# Patient Record
Sex: MALE | Race: WHITE | NOT HISPANIC OR LATINO | Employment: OTHER | URBAN - METROPOLITAN AREA
[De-identification: names, ages, dates, MRNs, and addresses within clinical notes are randomized per-mention and may not be internally consistent; named-entity substitution may affect disease eponyms.]

---

## 2017-07-25 RX ORDER — OMEPRAZOLE 20 MG/1
20 CAPSULE, DELAYED RELEASE ORAL DAILY
Qty: 90 CAPSULE | Refills: 0 | Status: SHIPPED | OUTPATIENT
Start: 2017-07-25 | End: 2022-04-07

## 2018-03-23 ENCOUNTER — TELEPHONE (OUTPATIENT)
Dept: BARIATRICS/WEIGHT MGMT | Facility: CLINIC | Age: 63
End: 2018-03-23

## 2018-03-23 NOTE — TELEPHONE ENCOUNTER
Pt called in wanting a rx for zofran.  Pt stated he is having some nausea when he eats. Please advise, thank you.

## 2018-03-23 NOTE — TELEPHONE ENCOUNTER
Notified pt that he will need a f/up to be seen for nausea.  Pt verbalized understanding and stated that he will see if his PCP will give him zofran.  Pt stated he will call back and schedule a appointment.

## 2018-05-30 ENCOUNTER — TELEPHONE (OUTPATIENT)
Dept: BARIATRICS/WEIGHT MGMT | Facility: CLINIC | Age: 63
End: 2018-05-30

## 2018-05-30 NOTE — TELEPHONE ENCOUNTER
Notified pt that we will refill 1 time and he will have to make an appointment to have further refills. Pt verbalized understanding.

## 2022-01-11 ENCOUNTER — OFFICE VISIT (OUTPATIENT)
Dept: BARIATRICS/WEIGHT MGMT | Facility: CLINIC | Age: 67
End: 2022-01-11

## 2022-01-11 VITALS
WEIGHT: 306.5 LBS | BODY MASS INDEX: 43.88 KG/M2 | HEART RATE: 76 BPM | SYSTOLIC BLOOD PRESSURE: 142 MMHG | OXYGEN SATURATION: 98 % | HEIGHT: 70 IN | DIASTOLIC BLOOD PRESSURE: 88 MMHG

## 2022-01-11 DIAGNOSIS — E66.01 OBESITY, CLASS III, BMI 40-49.9 (MORBID OBESITY): ICD-10-CM

## 2022-01-11 DIAGNOSIS — E55.9 HYPOVITAMINOSIS D: ICD-10-CM

## 2022-01-11 DIAGNOSIS — Z90.3 POSTGASTRECTOMY MALABSORPTION: ICD-10-CM

## 2022-01-11 DIAGNOSIS — Z13.21 MALNUTRITION SCREEN: ICD-10-CM

## 2022-01-11 DIAGNOSIS — R53.83 FATIGUE, UNSPECIFIED TYPE: Primary | ICD-10-CM

## 2022-01-11 DIAGNOSIS — K91.2 POSTGASTRECTOMY MALABSORPTION: ICD-10-CM

## 2022-01-11 DIAGNOSIS — Z13.0 SCREENING, IRON DEFICIENCY ANEMIA: ICD-10-CM

## 2022-01-11 PROCEDURE — 99204 OFFICE O/P NEW MOD 45 MIN: CPT | Performed by: SURGERY

## 2022-01-11 NOTE — PROGRESS NOTES
[facility]  [address]  [address]  [facility] [id number] [phone]        Patient Name:  Marco A Lea Jr..  :  1955      Date of Visit: 2022      Reason for Visit:   6.5 years postop      HPI: Marco A Lea Jr. is a 66 y.o. male s/p  LSG by CHRISTIAN on 8/6/15, s/p uneventful lap brandy. by Dr. Garza  on 16 for postprandial nausea.  LOV 2016.    Presents after prolonged absence.  Lowest weight after sleeve was 265.      Doing well.  No issues/concerns. Denies dysphagia, reflux, nausea, vomiting and abdominal pain.  Getting ??g prot/day.  Taking mag, Zinc, vit A, vit D, vit E, vit C, Vit B12..  Exercise: nothing.     Presurgery weight: 352 pounds.  Today's weight is (!) 139 kg (306 lb 8 oz) pounds, today's  Body mass index is 44.61 kg/m²., and@ weight loss since surgery is 46 pounds.  Gained about 50 pounds during COVID.      Interested in SIPS.  His wife's cousin just had a SIPS here 2-3 weeks ago.    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  Draws disability also.    Denies GERD. Prilosec PRN but hasn't taken in years.    Had panniculectomy at  3 years ago.      Past Medical History:   Diagnosis Date   • Chronic back pain     hurt in mining accident, disabled for back.     • Chronic narcotic use     per PCP   • Depression    • Dyspepsia    • Fatigue    • HTN (hypertension)    • OA (ocular albinism) (HCC)      Past Surgical History:   Procedure Laterality Date   • ANTERIOR CERVICAL FUSION     • COLON RESECTION  2014    reports laparoscopic bowel surgery for diverticulitis (not perforated, no colostomy, no laparotomy scars)   • GASTRIC SLEEVE LAPAROSCOPIC      Dr. Garza   • LAPAROSCOPIC CHOLECYSTECTOMY      Dr. Garza   • PANNICULECTOMY  2018    .  Complicated by infection   • SHOULDER SURGERY     • TONSILLECTOMY       Outpatient Medications Marked as Taking for the 22 encounter (Office Visit) with Kevin  "Farida Pleitez MD   Medication Sig Dispense Refill   • aspirin 325 MG EC tablet      • CloNIDine (CATAPRES) 0.1 MG tablet      • cyclobenzaprine (FLEXERIL) 10 MG tablet      • DULoxetine (CYMBALTA) 60 MG capsule      • HYDROcodone-acetaminophen (NORCO) 7.5-325 MG per tablet      • metoprolol succinate XL (TOPROL XL) 100 MG 24 hr tablet      • naproxen (EC NAPROSYN) 500 MG EC tablet      • omeprazole (PRILOSEC) 20 MG capsule Take 1 capsule by mouth Daily. 90 capsule 0   • ondansetron (ZOFRAN) 4 MG tablet Take 1 tablet by mouth Every 8 (Eight) Hours As Needed for nausea or vomiting. 20 tablet 0   • valsartan-hydrochlorothiazide (DIOVAN HCT) 160-12.5 MG per tablet          No Known Allergies    Social History     Socioeconomic History   • Marital status:    Tobacco Use   • Smoking status: Never Smoker   • Smokeless tobacco: Never Used   Substance and Sexual Activity   • Alcohol use: Never   • Drug use: Never   • Sexual activity: Defer       /88   Pulse 76   Ht 176.5 cm (69.5\")   Wt (!) 139 kg (306 lb 8 oz)   SpO2 98%   BMI 44.61 kg/m²     Physical Exam  Constitutional:       General: He is not in acute distress.     Appearance: He is well-developed. He is not diaphoretic.   HENT:      Head: Normocephalic and atraumatic.      Mouth/Throat:      Pharynx: No oropharyngeal exudate.   Eyes:      Conjunctiva/sclera: Conjunctivae normal.      Pupils: Pupils are equal, round, and reactive to light.   Pulmonary:      Effort: Pulmonary effort is normal. No respiratory distress.   Abdominal:      General: There is no distension.      Palpations: Abdomen is soft.      Comments: Hip to hip panniculectomy scar, very low umbilicus.  Prominent diastasis, lap scars   Skin:     General: Skin is warm and dry.      Coloration: Skin is not pale.   Neurological:      Mental Status: He is alert and oriented to person, place, and time.      Cranial Nerves: No cranial nerve deficit.   Psychiatric:         Behavior: Behavior " normal.         Thought Content: Thought content normal.           Assessment:  6.5 years s/p  LSG by GDW on 8/6/15    ICD-10-CM ICD-9-CM   1. Fatigue, unspecified type  R53.83 780.79   2. Hypovitaminosis D  E55.9 268.9   3. Malnutrition screen  Z13.21 V77.2   4. Screening, iron deficiency anemia  Z13.0 V78.0   5. Postgastrectomy malabsorption  K91.2 579.3    Z90.3    6. Obesity, Class III, BMI 40-49.9 (morbid obesity) ([facility])  E66.01 278.01         Plan:  Doing well. Continue w/ good food choices and healthy habits.  Continue protein >70g/day.  Continue routine exercise.  Routine bariatric labs ordered.  Continue vitamins w/ adjustments pending lab results.  Call w/ problems/concerns.     Refer to [facility] re: weight regain.  Fill out revision packet.    The patient was instructed to follow up in 1 year, sooner if needed.    note: approx 15 of the 25 minute visit was spent counseling on nutrition and necessary dietary/lifestyle modifications face to face.    Farida Brown MD

## 2022-02-23 ENCOUNTER — OFFICE VISIT (OUTPATIENT)
Dept: BARIATRICS/WEIGHT MGMT | Facility: CLINIC | Age: 67
End: 2022-02-23

## 2022-02-23 VITALS
BODY MASS INDEX: 44.38 KG/M2 | OXYGEN SATURATION: 99 % | HEIGHT: 70 IN | RESPIRATION RATE: 18 BRPM | TEMPERATURE: 97.9 F | WEIGHT: 310 LBS | SYSTOLIC BLOOD PRESSURE: 126 MMHG | DIASTOLIC BLOOD PRESSURE: 78 MMHG | HEART RATE: 84 BPM

## 2022-02-23 DIAGNOSIS — E66.01 OBESITY, CLASS III, BMI 40-49.9 (MORBID OBESITY): Primary | ICD-10-CM

## 2022-02-23 PROCEDURE — 99213 OFFICE O/P EST LOW 20 MIN: CPT | Performed by: PHYSICIAN ASSISTANT

## 2022-02-23 RX ORDER — KETOROLAC TROMETHAMINE 10 MG/1
10 TABLET, FILM COATED ORAL EVERY 6 HOURS PRN
COMMUNITY
Start: 2021-12-09

## 2022-02-23 RX ORDER — LIDOCAINE 50 MG/G
1 PATCH TOPICAL EVERY 24 HOURS
COMMUNITY
Start: 2022-02-04

## 2022-02-23 RX ORDER — OXYCODONE AND ACETAMINOPHEN 10; 325 MG/1; MG/1
1 TABLET ORAL EVERY 6 HOURS PRN
COMMUNITY

## 2022-02-23 RX ORDER — MECLIZINE HYDROCHLORIDE 25 MG/1
25 TABLET ORAL 3 TIMES DAILY PRN
COMMUNITY

## 2022-04-07 ENCOUNTER — OFFICE VISIT (OUTPATIENT)
Dept: BARIATRICS/WEIGHT MGMT | Facility: CLINIC | Age: 67
End: 2022-04-07

## 2022-04-07 VITALS
DIASTOLIC BLOOD PRESSURE: 62 MMHG | OXYGEN SATURATION: 98 % | HEIGHT: 70 IN | BODY MASS INDEX: 44.67 KG/M2 | TEMPERATURE: 97.6 F | SYSTOLIC BLOOD PRESSURE: 122 MMHG | WEIGHT: 312 LBS | HEART RATE: 74 BPM | RESPIRATION RATE: 18 BRPM

## 2022-04-07 DIAGNOSIS — E55.9 HYPOVITAMINOSIS D: ICD-10-CM

## 2022-04-07 DIAGNOSIS — I10 HYPERTENSION, UNSPECIFIED TYPE: ICD-10-CM

## 2022-04-07 DIAGNOSIS — F32.9 REACTIVE DEPRESSION: ICD-10-CM

## 2022-04-07 DIAGNOSIS — E78.5 HYPERLIPIDEMIA, UNSPECIFIED HYPERLIPIDEMIA TYPE: ICD-10-CM

## 2022-04-07 DIAGNOSIS — E66.01 OBESITY, CLASS III, BMI 40-49.9 (MORBID OBESITY): Primary | ICD-10-CM

## 2022-04-07 DIAGNOSIS — R53.83 FATIGUE, UNSPECIFIED TYPE: ICD-10-CM

## 2022-04-07 DIAGNOSIS — E74.89 OTHER SPECIFIED DISORDERS OF CARBOHYDRATE METABOLISM: ICD-10-CM

## 2022-04-07 DIAGNOSIS — R74.8 ELEVATED LIVER ENZYMES: ICD-10-CM

## 2022-04-07 DIAGNOSIS — M19.90 OSTEOARTHRITIS, UNSPECIFIED OSTEOARTHRITIS TYPE, UNSPECIFIED SITE: ICD-10-CM

## 2022-04-07 DIAGNOSIS — Z51.81 ENCOUNTER FOR THERAPEUTIC DRUG LEVEL MONITORING: ICD-10-CM

## 2022-04-07 PROBLEM — I45.10 RBBB: Status: ACTIVE | Noted: 2017-09-21

## 2022-04-07 PROCEDURE — 99215 OFFICE O/P EST HI 40 MIN: CPT | Performed by: NURSE PRACTITIONER

## 2022-04-07 PROCEDURE — G2212 PROLONG OUTPT/OFFICE VIS: HCPCS | Performed by: NURSE PRACTITIONER

## 2022-04-07 PROCEDURE — 96372 THER/PROPH/DIAG INJ SC/IM: CPT | Performed by: NURSE PRACTITIONER

## 2022-04-07 RX ORDER — CYANOCOBALAMIN 1000 UG/ML
1000 INJECTION, SOLUTION INTRAMUSCULAR; SUBCUTANEOUS ONCE
Status: COMPLETED | OUTPATIENT
Start: 2022-04-07 | End: 2022-04-07

## 2022-04-07 RX ORDER — ERGOCALCIFEROL 1.25 MG/1
50000 CAPSULE ORAL WEEKLY
Qty: 4 CAPSULE | Refills: 2 | Status: SHIPPED | OUTPATIENT
Start: 2022-04-07 | End: 2022-07-14

## 2022-04-07 RX ORDER — PROPRANOLOL HYDROCHLORIDE 40 MG/1
40 TABLET ORAL DAILY
COMMUNITY
Start: 2022-03-02

## 2022-04-07 RX ADMIN — CYANOCOBALAMIN 1000 MCG: 1000 INJECTION, SOLUTION INTRAMUSCULAR; SUBCUTANEOUS at 16:50

## 2022-04-07 NOTE — ASSESSMENT & PLAN NOTE
Patient's (Body mass index is 45.41 kg/m².) indicates that they are morbidly obese (BMI > 40 or > 35 with obesity - related health condition) with health conditions that include hypertension, dyslipidemias, GERD, osteoarthritis and depression . Weight is worsening. BMI is is above average; BMI management plan is completed. We discussed low calorie, low carb based diet program, portion control, increasing exercise, management of depression/anxiety/stress to control compensatory eating and food journal.  Topics of discussion included obesity as a disease, nutritional education on food groups, exercise, and medications. Patient was instructed in adequate protein, controlled carb and controlled fat intake.   Patient received instructions on using the medicines as a tool in controlling their weight with nutritional and behavioral changes. Risks and benefits were discussed. I believe the potential benefits of medication helping to decrease weight outweighs the risks. Patient is to try nutritonal/behavioral changes only first.   Patient received our clinic education booklet.   Our patient consent form was reviewed including potential risks of weight loss. We also reviewed our confidentiality and HIPPA statements. Patients current FITT score was reviewed along with current capability for exercise tolerance and a patient will work towards a FITT score of:     Frequency   Intensity Time Strength Training   []   0 None  []   0 None  []   0 None  []   0 None    []   1 (1-2x/week) []   1 (light) []   1 (<10 min) []   1 (1x/week)   [x]   2 (3-5x/week) [x]   2 (moderate) []   2 (10-20 min) [x]   2 (2x/week)   []   3 (daily)   []   3 (moderately hard)  []   4 (very hard) []   3 (20-30 min)  [x]   4 (>30 min) []   3 (3-4x/week)       Patient's past medical history was reviewed in detail and barriers to weight loss were identified and discussed. Past efforts at weight reduction on their own as well as under physician supervision were  documented and discussed.  I advised patient to continue routine care with their Primary Care Provider.     Nutritional recommendations and goals were reviewed including Calories:8962-1794 daily adjusted for exercise calories burnt, Protein: 100-150 g daily, Net carbs (total carb - fiber) of 50-75g per day.  Start to keep a food journal and bring into next visit in 2 weeks for review. Practice the behavioral modification technique of mindful eating. Take one MVI daily and 2000mg fish oil daily. Take other medications and supplements as directed.  Complete labwork (external order given).  Decrease cymbalta by 1/2 until next follow up.   Try b-12 injection today for energy, start vitamin D high dose.   Consider: tenuate (liked in the past), metformin to counterbalance beta blocker and cymbalta, topiramate because of MBS status.   Increase physical activity as tolerated with back pain.

## 2022-04-07 NOTE — PROGRESS NOTES
"  [facility]  [address]  [place] [address]   Office Note      Date: 2022  Patient Name: Marco A Lae Jr.  MRN: [id number]  : 1955  Subjective  Subjective     Chief Complaint  Obesity Management follow-up          Marco A Lea Jr. presents to [facility] for obesity management and weight regain following MBS. s/p LSG 8/6/15 [facility]. Presurgery weight: 352 lbs.  Lowest weight: 265 lbs. Met with Monserrat Carrion PA-C to discuss SIPS revision option and has decided instead to pursue medical weight loss. Still has +satiety, has to use zofran for nausea if he eats too much. Gained 50+ lb during covid. Energy level is low, can't find the \"umph\" to restart weight loss. Took tenuate years ago, loved it, interested in retrying. 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 Was a weight  prior to his work-related back injury. The following seem to sabotage weight loss efforts:Lack of time for planning & self, comfort/stress eating, enjoyment of food, mindless eating (snacking while working or watching TV) and boredom eating  Highest lifetime weight: 375 pounds. Today's weight is312.0 (!) 142 kg (312 lb) pounds.   Weight 5 years ago: 260  The patient is not currently exercising, has never counted calories.     Pertinent medical history  Hypertension: on clonidine,valsartan-hctz. Had exercise stress test in 2019 due to family history. Normal per patient. Per patient hypertension is perventive.   Tremors: started propranolol about 6 months.   Hyperlipidemia: took medication but hated how he felt.  Elevated liver enzymes: patient unaware of any diagnosis.   GERD: stable on omeprazole.   OA: chronic narcotic use, cymbalta  Depression: started cymbalta after his son passed.   - snoring, was evaluated for HUYEN in the past but was negative.   Extremely dry skin    Review of Systems " "  Constitutional: Positive for fatigue (weight gain).        Positive for weight gain   HENT: Negative for trouble swallowing.         Negative for throat swelling   Respiratory: Negative for shortness of breath and wheezing.         Negative for snoring   Cardiovascular: Negative for chest pain, palpitations and leg swelling.   Gastrointestinal: Negative for abdominal pain, constipation, diarrhea, GERD and indigestion.   Endocrine: Negative for cold intolerance, heat intolerance, polydipsia, polyphagia and polyuria.        Negative for loss of hair  Negative for hirsutism     Genitourinary:        Denies menstrual irregularities   Musculoskeletal: Positive for back pain (excessive limitations and chronic pain). Negative for arthralgias.        Denies exercise limitations  Denies chronic pain   Skin: Positive for dry skin.        Negative for acne   Neurological: Negative for headache and memory problem.        Negative for paresthesias   Psychiatric/Behavioral: Positive for depressed mood. Negative for self-injury, sleep disturbance and suicidal ideas. The patient is not nervous/anxious.      PHQ-9 Total Score:   13      Objective   Body mass index is 45.41 kg/m².  Body composition analysis completed and showed:   %body fat: 64.4    Measurements (in inches)  Neck: 18\"  Chest: 54.2\"  Waist: 56\"  Hips: 57\"  Thighs: 47,5\"    Vital Signs:   /62 (BP Location: Right arm, Patient Position: Sitting, Cuff Size: Adult)   Pulse 74   Temp 97.6 °F (36.4 °C) (Temporal)   Resp 18   Ht 176.5 cm (69.5\")   Wt (!) 142 kg (312 lb)   SpO2 98%   BMI 45.41 kg/m²     Physical Exam   General appears stated age and normal appearance   HEENT PERRLA, EOM intact, conjunctivae normal and without thyromegaly or thyroid nodules   Chest/lungs Normal rate, Regular rhythm, Breathing is unlabored and Clear to auscultation bilaterally   Abdomen deferred   Extremities pitting edema 2+ BLE   Neuro Good historian and No focal deficit   Skin " Dry skin and Warm, dry, intact   Psych normal behavior, normal thought content and normal concentration     Result Review :   The following data was reviewed by: WINIFRED Lechuga on 04/07/2022:  CMP- ^ALT/AST, Vit D 30                  Assessment / Plan       Diagnoses and all orders for this visit:    1. Obesity, Class III, BMI 40-49.9 (morbid obesity) ([facility]) (Primary)  Assessment & Plan:  Patient's (Body mass index is 45.41 kg/m².) indicates that they are morbidly obese (BMI > 40 or > 35 with obesity - related health condition) with health conditions that include hypertension, dyslipidemias, GERD, osteoarthritis and depression . Weight is worsening. BMI is is above average; BMI management plan is completed. We discussed low calorie, low carb based diet program, portion control, increasing exercise, management of depression/anxiety/stress to control compensatory eating and food journal.  Topics of discussion included obesity as a disease, nutritional education on food groups, exercise, and medications. Patient was instructed in adequate protein, controlled carb and controlled fat intake.   Patient received instructions on using the medicines as a tool in controlling their weight with nutritional and behavioral changes. Risks and benefits were discussed. I believe the potential benefits of medication helping to decrease weight outweighs the risks. Patient is to try nutritonal/behavioral changes only first.   Patient received our clinic education booklet.   Our patient consent form was reviewed including potential risks of weight loss. We also reviewed our confidentiality and HIPPA statements. Patients current FITT score was reviewed along with current capability for exercise tolerance and a patient will work towards a FITT score of:     Frequency   Intensity Time Strength Training   []   0 None  []   0 None  []   0 None  []   0 None    []   1 (1-2x/week) []   1 (light) []   1 (<10 min) []   1 (1x/week)   [x]   2  (3-5x/week) [x]   2 (moderate) []   2 (10-20 min) [x]   2 (2x/week)   []   3 (daily)   []   3 (moderately hard)  []   4 (very hard) []   3 (20-30 min)  [x]   4 (>30 min) []   3 (3-4x/week)       Patient's past medical history was reviewed in detail and barriers to weight loss were identified and discussed. Past efforts at weight reduction on their own as well as under physician supervision were documented and discussed.  I advised patient to continue routine care with their Primary Care Provider.     Nutritional recommendations and goals were reviewed including Calories:8858-4059 daily adjusted for exercise calories burnt, Protein: 100-150 g daily, Net carbs (total carb - fiber) of 50-75g per day.  Start to keep a food journal and bring into next visit in 2 weeks for review. Practice the behavioral modification technique of mindful eating. Take one MVI daily and 2000mg fish oil daily. Take other medications and supplements as directed.  Complete labwork (external order given).  Decrease cymbalta by 1/2 until next follow up.   Try b-12 injection today for energy, start vitamin D high dose.   Consider: tenuate (liked in the past), metformin to counterbalance beta blocker and cymbalta, topiramate because of MBS status.   Increase physical activity as tolerated with back pain.    Orders:  -     Hemoglobin A1c; Future  -     Lipid Panel; Future  -     TSH; Future    2. Hypertension, unspecified type  Assessment & Plan:  Hypertension is stable.  Weight loss.  Regular aerobic exercise.  Blood pressure will be reassessed in 2 weeks.      3. Hyperlipidemia, unspecified hyperlipidemia type  Assessment & Plan:  Check labs      4. Elevated liver enzymes  Assessment & Plan:  Weight reduction      5. Hypovitaminosis D  -     vitamin D (ERGOCALCIFEROL) 1.25 MG (72437 UT) capsule capsule; Take 1 capsule by mouth 1 (One) Time Per Week.  Dispense: 4 capsule; Refill: 2    6. Fatigue, unspecified type  -     cyanocobalamin injection  1,000 mcg    7. Other specified disorders of carbohydrate metabolism (HCC)   -     Hemoglobin A1c; Future    8. Osteoarthritis, unspecified osteoarthritis type, unspecified site    9. Encounter for therapeutic drug level monitoring  -     Urine Drug Screen - Urine, Clean Catch    10. Reactive depression  Assessment & Plan:  Patient's depression is stable. Started cymbalta after son's death in 2003. Interested in stopping, sometimes takes 1/2 dose. We discussed this medication can cause weight gain.   Plan: decrease dose to 30mg until follow up appointment in 2 weeks.          I spent 75 minutes caring for Marco A on this date of service. This time includes time spent by me in the following activities:preparing for the visit, reviewing tests, obtaining and/or reviewing a separately obtained history, performing a medically appropriate examination and/or evaluation , counseling and educating the patient/family/caregiver, ordering medications, tests, or procedures and documenting information in the medical record  Follow Up   Return in about 2 weeks (around 4/21/2022) for Next scheduled follow up, Labs this visit.  Patient was given instructions and counseling regarding his condition or for health maintenance advice. Please see specific information pulled into the AVS if appropriate.     Luci Uriostegui, APRN  04/07/2022

## 2022-04-07 NOTE — ASSESSMENT & PLAN NOTE
Patient's depression is stable. Started cymbalta after son's death in 2003. Interested in stopping, sometimes takes 1/2 dose. We discussed this medication can cause weight gain.   Plan: decrease dose to 30mg until follow up appointment in 2 weeks.

## 2022-04-07 NOTE — ASSESSMENT & PLAN NOTE
Hypertension is stable.  Weight loss.  Regular aerobic exercise.  Blood pressure will be reassessed in 2 weeks.

## 2022-04-09 LAB
AMPHETAMINES UR QL SCN: NEGATIVE NG/ML
BARBITURATES UR QL SCN: NEGATIVE NG/ML
BENZODIAZ UR QL SCN: NEGATIVE NG/ML
BZE UR QL SCN: NEGATIVE NG/ML
CANNABINOIDS UR QL SCN: NEGATIVE NG/ML
CREAT UR-MCNC: 88.4 MG/DL (ref 20–300)
LABORATORY COMMENT REPORT: ABNORMAL
METHADONE UR QL SCN: NEGATIVE NG/ML
OPIATES UR QL SCN: NEGATIVE NG/ML
OXYCODONE+OXYMORPHONE UR QL SCN: POSITIVE NG/ML
PCP UR QL: NEGATIVE NG/ML
PH UR: 5.8 [PH] (ref 4.5–8.9)
PROPOXYPH UR QL SCN: NEGATIVE NG/ML

## 2022-04-26 ENCOUNTER — TELEPHONE (OUTPATIENT)
Dept: BARIATRICS/WEIGHT MGMT | Facility: CLINIC | Age: 67
End: 2022-04-26

## 2022-04-26 NOTE — TELEPHONE ENCOUNTER
Patients wife called, she wanted you to know patient was in [facility] for UTI that turned septic, and they had to reschedule appointment.  She states hospital sent the patient home on ozempic but the copay was very expensive.  They will give you more information at his next visit.

## 2022-07-14 ENCOUNTER — TELEMEDICINE (OUTPATIENT)
Dept: BARIATRICS/WEIGHT MGMT | Facility: CLINIC | Age: 67
End: 2022-07-14

## 2022-07-14 VITALS
WEIGHT: 300 LBS | SYSTOLIC BLOOD PRESSURE: 120 MMHG | BODY MASS INDEX: 42.95 KG/M2 | HEIGHT: 70 IN | DIASTOLIC BLOOD PRESSURE: 75 MMHG | HEART RATE: 70 BPM

## 2022-07-14 DIAGNOSIS — E55.9 HYPOVITAMINOSIS D: ICD-10-CM

## 2022-07-14 DIAGNOSIS — E66.01 OBESITY, CLASS III, BMI 40-49.9 (MORBID OBESITY): Primary | ICD-10-CM

## 2022-07-14 PROCEDURE — 99215 OFFICE O/P EST HI 40 MIN: CPT | Performed by: NURSE PRACTITIONER

## 2022-07-14 RX ORDER — PANTOPRAZOLE SODIUM 40 MG/1
TABLET, DELAYED RELEASE ORAL
COMMUNITY
End: 2022-11-03 | Stop reason: ALTCHOICE

## 2022-07-14 RX ORDER — ATORVASTATIN CALCIUM 10 MG/1
TABLET, FILM COATED ORAL
COMMUNITY
Start: 2022-04-21

## 2022-07-14 RX ORDER — SEMAGLUTIDE 1.34 MG/ML
INJECTION, SOLUTION SUBCUTANEOUS
COMMUNITY
End: 2022-11-03 | Stop reason: SINTOL

## 2022-07-14 RX ORDER — LEVOCETIRIZINE DIHYDROCHLORIDE 5 MG/1
TABLET, FILM COATED ORAL
COMMUNITY

## 2022-07-14 RX ORDER — BUPROPION HYDROCHLORIDE 75 MG/1
TABLET ORAL
COMMUNITY

## 2022-07-14 RX ORDER — TAMSULOSIN HYDROCHLORIDE 0.4 MG/1
CAPSULE ORAL
COMMUNITY
Start: 2022-04-21

## 2022-07-14 NOTE — PROGRESS NOTES
Office Note      Date: 2022  Patient Name: Marco A Lea Jr.  MRN: [id number]  : 1955    Patient presents during the COVID-19 pandemic/federally declared [facility] of public health emergency.  This service was conducted via Zoom.  Patient is located at his home address.  Provider is located at her home address. The use of a video visit has been reviewed with the patient and verbal informed consent has been obtained.  Subjective  Subjective     Chief Complaint  Obesity Management follow-up    Subjective          Marco A Lea Jr. presents to [facility] via telehealth for obesity management and weight regain. s/p LSG 8/6/15 [facility]. Presurgery weight: 352 lbs. Lowest weight: 265 lbs.    Patient is satisfied with weight loss progress. Appetite is well controlled. Was writing down everything he ate until he got sick. Reports no side effects of prescribed medications today. Patient was hospitalized for UTI/bladder infection. Chronic cough from long-covid was treated with prednisone for 14 days about 2 months ago. Provider in hospital made many changes to maintenance medications in effort to help with obesity- changed cymblata to wellbutrin, gave him ozempic injection and sent him home with sample (this is not on his formulary), started him on cholesterol medication. States he felt sick to his stomach with ozempic injection, but he was also very ill at the time.  Had monthly workman's comp visit and his vitals were stable, weight was 300lb.  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     Review of Systems   Constitutional: Positive for fatigue.   Eyes: Negative for visual disturbance.   Cardiovascular: Negative for chest pain and palpitations.   Gastrointestinal: Negative for abdominal pain, constipation, diarrhea, nausea and GERD.   Endocrine: Negative for polydipsia, polyphagia and polyuria.   Musculoskeletal: Negative  "for arthralgias and myalgias.   Neurological: Positive for tremors. Negative for dizziness, light-headedness, headache and memory problem.        Parasthesias negative   Psychiatric/Behavioral: Positive for depressed mood. Negative for sleep disturbance. The patient is not nervous/anxious.      Objective   Body mass index is 43.67 kg/m².  Start weight: 312 pounds.    Total weight loss: -12 pounds/-3.8%  Change in weight since last visit: -12lb    /75   Pulse 70   Ht 176.5 cm (69.5\")   Wt 136 kg (300 lb)   BMI 43.67 kg/m²       Result Review :                 Assessment and Plan    Diagnoses and all orders for this visit:    1. Obesity, Class III, BMI 40-49.9 (morbid obesity) (HCC) (Primary)  Assessment & Plan:  Patient's (Body mass index is 43.67 kg/m².) indicates that they are morbidly obese (BMI > 40 or > 35 with obesity - related health condition) with health conditions that include hypertension, dyslipidemias, GERD and fatty liver . Weight is improving with lifestyle modifications. BMI is is above average; BMI management plan is completed. We discussed low calorie, low carb based diet program, portion control, increasing exercise, pharmacologic options including ozempic and food journal.    I have instructed the patient to continue with pursuit of medical weight loss as a part of this program. Patient does meet criteria for use of anorectics at this time as BMI >27 and is not at treatment goal.     Continue nutritional focus and work towards new exercise FITT goal of: 2-2-4-2.  The current plan for this month includes: continue to work on lifestyle behavioral changes. Restart daily food journal.   Resume ozempic injections (sample provided by provider in the hospital). Will re-evaluate when that is complete, it is not on his formulary so he will only be able to use short term. If he is unable to tolerated side effects, he will contact me and we can start tenuate (discussed this is indicated for short " term use of 3 months). Goal 10-15lb.     Restart ozempic. Denies family or personal history of pancreatitis, MTC, or MEN 2. Discussed common side effects of nausea, diarrhea, vomiting, constipation, stomach pain, headache, fatigue, upset stomach, dizziness, feeling bloated, belching, gas, stomach flu, heartburn.              2. Hypovitaminosis D  Assessment & Plan:  Completed prescription therapy. Start daily therapy of 5,000 ius to prevent deficiency.         I spent 52 minutes caring for Marco A on this date of service. This time includes time spent by me in the following activities:obtaining and/or reviewing a separately obtained history, performing a medically appropriate examination and/or evaluation , counseling and educating the patient/family/caregiver, ordering medications, tests, or procedures, referring and communicating with other health care professionals  and documenting information in the medical record  Follow Up   Return in about 7 weeks (around 9/1/2022) for Next scheduled follow up.  Patient was given instructions and counseling regarding his condition or for health maintenance advice. Please see specific information pulled into the AVS if appropriate.     WINIFRED Cardoso

## 2022-07-14 NOTE — ASSESSMENT & PLAN NOTE
Patient's (Body mass index is 43.67 kg/m².) indicates that they are morbidly obese (BMI > 40 or > 35 with obesity - related health condition) with health conditions that include hypertension, dyslipidemias, GERD and fatty liver . Weight is improving with lifestyle modifications. BMI is is above average; BMI management plan is completed. We discussed low calorie, low carb based diet program, portion control, increasing exercise, pharmacologic options including ozempic and food journal.    I have instructed the patient to continue with pursuit of medical weight loss as a part of this program. Patient does meet criteria for use of anorectics at this time as BMI >27 and is not at treatment goal.     Continue nutritional focus and work towards new exercise FITT goal of: 2-2-4-2.  The current plan for this month includes: continue to work on lifestyle behavioral changes. Restart daily food journal.   Resume ozempic injections (sample provided by provider in the hospital). Will re-evaluate when that is complete, it is not on his formulary so he will only be able to use short term. If he is unable to tolerated side effects, he will contact me and we can start tenuate (discussed this is indicated for short term use of 3 months). Goal 10-15lb.     Restart ozempic. Denies family or personal history of pancreatitis, MTC, or MEN 2. Discussed common side effects of nausea, diarrhea, vomiting, constipation, stomach pain, headache, fatigue, upset stomach, dizziness, feeling bloated, belching, gas, stomach flu, heartburn.

## 2022-07-18 ENCOUNTER — TELEPHONE (OUTPATIENT)
Dept: BARIATRICS/WEIGHT MGMT | Facility: CLINIC | Age: 67
End: 2022-07-18

## 2022-07-18 DIAGNOSIS — E66.01 OBESITY, CLASS III, BMI 40-49.9 (MORBID OBESITY): Primary | ICD-10-CM

## 2022-07-18 NOTE — TELEPHONE ENCOUNTER
Patient called stating that he is having a bad time with the Ozempic. He states that he can not function and it is making him sick. Patient also states that you are supposed to start him on Tenuate (Diethylpropion) 75 mg.    Please advise.

## 2022-07-19 RX ORDER — DIETHYLPROPION HYDROCHLORIDE 75 MG/1
TABLET ORAL
Qty: 30 EACH | Refills: 1 | Status: SHIPPED | OUTPATIENT
Start: 2022-07-19 | End: 2022-11-03 | Stop reason: SDUPTHER

## 2022-07-19 NOTE — TELEPHONE ENCOUNTER
He is to discontinue ozempic and start diethylpropion. Common side effects include: insomnia, constipation, jitteriness, dry mouth, headache. Monitor blood pressure at home. Stop medication and call the office for any reading >150/90.   After review of Mr. Marco A MCCULLOUGH Leonora Turcios. lab work, EKG, urine drug screen, PMH, and medical risk factors, it has been decided that it is medically appropriate to use an anorectic medication for assistance of weight loss. It is felt that the risks of staying at current body fat percentage and potential adverse co-morbid conditions outweighs the risk of medication use. Medication risks and benefits were again reviewed with patient. he has signed the medication agreement form & has decided to try the use of an anorectic medication for the assistance of weight loss.

## 2022-11-03 ENCOUNTER — TELEMEDICINE (OUTPATIENT)
Dept: BARIATRICS/WEIGHT MGMT | Facility: CLINIC | Age: 67
End: 2022-11-03

## 2022-11-03 VITALS
HEIGHT: 70 IN | BODY MASS INDEX: 39.8 KG/M2 | SYSTOLIC BLOOD PRESSURE: 110 MMHG | HEART RATE: 62 BPM | DIASTOLIC BLOOD PRESSURE: 74 MMHG | WEIGHT: 278 LBS

## 2022-11-03 DIAGNOSIS — E66.01 OBESITY, CLASS III, BMI 40-49.9 (MORBID OBESITY): Primary | ICD-10-CM

## 2022-11-03 DIAGNOSIS — I10 HYPERTENSION, UNSPECIFIED TYPE: ICD-10-CM

## 2022-11-03 DIAGNOSIS — G89.29 CHRONIC BACK PAIN, UNSPECIFIED BACK LOCATION, UNSPECIFIED BACK PAIN LATERALITY: ICD-10-CM

## 2022-11-03 DIAGNOSIS — M54.9 CHRONIC BACK PAIN, UNSPECIFIED BACK LOCATION, UNSPECIFIED BACK PAIN LATERALITY: ICD-10-CM

## 2022-11-03 PROCEDURE — 99214 OFFICE O/P EST MOD 30 MIN: CPT | Performed by: NURSE PRACTITIONER

## 2022-11-03 RX ORDER — FLUTICASONE PROPIONATE 50 MCG
SPRAY, SUSPENSION (ML) NASAL
COMMUNITY
Start: 2022-08-26

## 2022-11-03 RX ORDER — DIETHYLPROPION HYDROCHLORIDE 75 MG/1
TABLET ORAL
Qty: 30 EACH | Refills: 2 | Status: SHIPPED | OUTPATIENT
Start: 2022-11-03

## 2022-11-03 RX ORDER — LANOLIN ALCOHOL/MO/W.PET/CERES
1000 CREAM (GRAM) TOPICAL DAILY
COMMUNITY

## 2022-11-03 RX ORDER — OMEPRAZOLE 40 MG/1
CAPSULE, DELAYED RELEASE ORAL
COMMUNITY
Start: 2022-10-21

## 2022-11-03 RX ORDER — VALSARTAN AND HYDROCHLOROTHIAZIDE 320; 25 MG/1; MG/1
TABLET, FILM COATED ORAL
COMMUNITY
Start: 2022-10-21

## 2022-11-03 NOTE — PROGRESS NOTES
"     Office Note      Date: 2022  Patient Name: Marco A Lea Jr.  MRN: [id number]  : 1955    Patient presents during the COVID-19 pandemic/federally declared state of public health emergency.  This service was conducted via [employer].  Patient is located at his home address.  Provider is located at her primary office location. The use of a video visit has been reviewed with the patient and verbal informed consent has been obtained.  Subjective  Subjective     Chief Complaint  Obesity Management follow-up    Subjective          Marco A Lea Jr. presents to [facility] via telehealth for obesity management and weight regain following MBS. s/p LSG 8/6/15 GDW, s/p lap brandy 16 GDW (acalculous cholecystitis). Presurgery weight: 352 lbs.  Lowest weight: 265 lbs.     Patient is satisfied with weight loss progress. Appetite is well controlled. Doesn't think about food any more. Reports no side effects of prescribed medications today. Feels wonderful, \"it has changed my life\". Started getting tremors about 3 months ago and his doctor increased diovan, now it is controlled. Back pain improving with weight loss.   A few days from his food journal (he is writing down his foods every day)  Coffee: 20 oz with sweet and low and vanilla flavoring   L: shrimp salad   D: baked chicken and a little bit of mashed potatoes    L: chinese meats and broccoli and mushrooms   D: leftover chinese    L: 1 grilled or baked pork chop or boiled hamburger ronen  D: steak and tuna with a side of broccoli and spinach    Eating mostly meat but small portions. 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 Also walks back and forth to his RV frequently and working in the yard. He drinks at least 60oz of water a day with no sugar tea.     Review of Systems   Constitutional: Positive for appetite change (better). Negative for " "fatigue.   Eyes: Negative for blurred vision, double vision and visual disturbance.   Cardiovascular: Negative for chest pain, palpitations and leg swelling.   Gastrointestinal: Positive for GERD (only with cabbage) and indigestion (with cabbage). Negative for abdominal pain, constipation, diarrhea, nausea and vomiting.   Endocrine: Negative for polydipsia, polyphagia and polyuria.   Musculoskeletal: Positive for back pain. Negative for arthralgias and myalgias.   Neurological: Negative for dizziness, tremors, light-headedness, headache and memory problem.        Parasthesias negative   Psychiatric/Behavioral: Positive for depressed mood (only grief related). Negative for hallucinations, sleep disturbance and stress. The patient is not nervous/anxious.      Objective   Body mass index is 40.46 kg/m².  Start weight: 312 pounds.    Total weight loss: -34 pounds/-10%  Change in weight since last visit: -22lb    Provided by patient:  Measurements (in inches)  Waist: 54\"  /74   Pulse 62   Ht 176.5 cm (69.5\")   Wt 126 kg (278 lb)   BMI 40.46 kg/m²       Result Review :                 Assessment and Plan    Diagnoses and all orders for this visit:    1. Obesity, Class III, BMI 40-49.9 (morbid obesity) (Formerly [facility]) (Primary)  Assessment & Plan:  Patient's (Body mass index is 40.46 kg/m².) indicates that they are morbidly obese (BMI > 40 or > 35 with obesity - related health condition) with health conditions that include hypertension, dyslipidemias, GERD and osteoarthritis . Weight is improving with treatment. BMI is is above average; BMI management plan is completed. We discussed low calorie, low carb based diet program, portion control, increasing exercise, joining a fitness center or start home based exercise program, pharmacologic options including diethylpropion and an fritz-based approach such as [employer] Pal or Lose It.    I have instructed the patient to continue with pursuit of medical weight loss as a part of " "this program. Patient does meet criteria for use of anorectics at this time as is not at treatment goal and BMI >30.     Continue nutritional focus and work towards new exercise FITT goal of: 2-2-4-2. Add walks with wife.  The current plan for this month includes: continue to work on lifestyle behavioral changes and continue nutrition focus with daily food journal. Try to replace coffee creamer with protein shake. Treatment goal 250lb.   I have discussed with the patient the use of using meds > 3 months is \"off label\" and we have discussed risks and benefits. I think it is medically reasonable to continue the medication as the patient has either lost > 10 %  Initial body weight. And is continuing with active weight loss.   Continue sympathomimetic (medication refilled).  This patient 1) has no evidence of serious cardiovascular disease; 2) does not have serious psychiatric disease or a history of substance abuse; 3) has been informed about weight loss medications that are FDA approved for long-term use and told that these have been all documented to be safe and effective whereas phentermine has not; 4) does not demonstrate a clinically significant increase in pulse or blood pressure when taking phentermine; and 5) demonstrate significant weight loss while using the medication.  Patient understands that all antiobesity medications are contraindicated in pregnancy.  Patient denies a history of glaucoma.  Patient understands that long-term use of phentermine is considered off label use of this medication, however, that the endocrine Society and recent research supports that long-term use of phentermine does not appear to have detrimental health effects when used and the appropriate patient.  In addition, a 2019 study published in an obesity journal on 13,972 patient's concluded that \"recommendations to limit phentermine to less than 3 months do not align with current concept of pharmacologic treatment of obesity\", and " "that \"long-term phentermine users experience greater weight loss without apparent increases in cardiovascular risk\".    We reviewed potential side effects including insomnia, dry mouth, increased heart rate and blood pressure, increased anxiety.  We reviewed reducing caffeine consumption while taking phentermine.  especially if the patient is experience side effects.  The potential risk and benefits of phentermine have been reviewed with the patient, and alternative treatment options were discussed.  All questions were answered, and the patient wishes to move forward with this medication.             Orders:  -     Diethylpropion HCl ER 75 MG tablet sustained-release 24 hour; Take one tablet at 11 am.  Dispense: 30 each; Refill: 2    2. Hypertension, unspecified type  Assessment & Plan:  Hypertension is controlled.  Continue current treatment regimen.  Weight loss.  Regular aerobic exercise.  managed by PCP  Blood pressure will be reassessed at the next regular appointment.      3. Chronic back pain, unspecified back location, unspecified back pain laterality  Assessment & Plan:  Improving with weight reduction. Increase physical activity as tolerated.         I spent 39 minutes caring for Marco A on this date of service. This time includes time spent by me in the following activities:preparing for the visit, obtaining and/or reviewing a separately obtained history, performing a medically appropriate examination and/or evaluation , counseling and educating the patient/family/caregiver, ordering medications, tests, or procedures and documenting information in the medical record  Follow Up   Return in about 3 months (around 2/3/2023) for Next scheduled follow up.  Patient was given instructions and counseling regarding his condition or for health maintenance advice. Please see specific information pulled into the AVS if appropriate.     WINIFRED Cardoso  "

## 2022-11-03 NOTE — ASSESSMENT & PLAN NOTE
Hypertension is controlled.  Continue current treatment regimen.  Weight loss.  Regular aerobic exercise.  managed by PCP  Blood pressure will be reassessed at the next regular appointment.

## 2022-11-03 NOTE — ASSESSMENT & PLAN NOTE
"Patient's (Body mass index is 40.46 kg/m².) indicates that they are morbidly obese (BMI > 40 or > 35 with obesity - related health condition) with health conditions that include hypertension, dyslipidemias, GERD and osteoarthritis . Weight is improving with treatment. BMI is is above average; BMI management plan is completed. We discussed low calorie, low carb based diet program, portion control, increasing exercise, joining a fitness center or start home based exercise program, pharmacologic options including diethylpropion and an fritz-based approach such as [employer] Pal or Lose It.    I have instructed the patient to continue with pursuit of medical weight loss as a part of this program. Patient does meet criteria for use of anorectics at this time as is not at treatment goal and BMI >30.     Continue nutritional focus and work towards new exercise FITT goal of: 2-2-4-2. Add walks with wife.  The current plan for this month includes: continue to work on lifestyle behavioral changes and continue nutrition focus with daily food journal. Try to replace coffee creamer with protein shake. Treatment goal 250lb.   I have discussed with the patient the use of using meds > 3 months is \"off label\" and we have discussed risks and benefits. I think it is medically reasonable to continue the medication as the patient has either lost > 10 %  Initial body weight. And is continuing with active weight loss.   Continue sympathomimetic (medication refilled).  This patient 1) has no evidence of serious cardiovascular disease; 2) does not have serious psychiatric disease or a history of substance abuse; 3) has been informed about weight loss medications that are FDA approved for long-term use and told that these have been all documented to be safe and effective whereas phentermine has not; 4) does not demonstrate a clinically significant increase in pulse or blood pressure when taking phentermine; and 5) demonstrate significant weight " "loss while using the medication.  Patient understands that all antiobesity medications are contraindicated in pregnancy.  Patient denies a history of glaucoma.  Patient understands that long-term use of phentermine is considered off label use of this medication, however, that the endocrine Society and recent research supports that long-term use of phentermine does not appear to have detrimental health effects when used and the appropriate patient.  In addition, a 2019 study published in an obesity journal on [id number] patient's concluded that \"recommendations to limit phentermine to less than 3 months do not align with current concept of pharmacologic treatment of obesity\", and that \"long-term phentermine users experience greater weight loss without apparent increases in cardiovascular risk\".    We reviewed potential side effects including insomnia, dry mouth, increased heart rate and blood pressure, increased anxiety.  We reviewed reducing caffeine consumption while taking phentermine.  especially if the patient is experience side effects.  The potential risk and benefits of phentermine have been reviewed with the patient, and alternative treatment options were discussed.  All questions were answered, and the patient wishes to move forward with this medication.           "

## 2023-05-02 DIAGNOSIS — E66.01 OBESITY, CLASS III, BMI 40-49.9 (MORBID OBESITY): ICD-10-CM

## 2023-05-02 RX ORDER — DIETHYLPROPION HYDROCHLORIDE 75 MG/1
TABLET ORAL
Refills: 2 | OUTPATIENT
Start: 2023-05-02

## 2023-05-02 NOTE — TELEPHONE ENCOUNTER
Rx Refill Note  Requested Prescriptions     Pending Prescriptions Disp Refills   • Diethylpropion HCl ER 75 MG tablet sustained-release 24 hour [Pharmacy Med Name: DIETHYLPROPION ER 75 MG TAB 75 Tablet]  2     Sig: TAKE ONE TABLET BY MOUTH AT 11AM AS DIRECTED      Last office visit with prescribing clinician: 4/7/2022   Last telemedicine visit with prescribing clinician: 11/03/2022  Next office visit with prescribing clinician: Visit date not found                         Would you like a call back once the refill request has been completed: [] Yes [] No    If the office needs to give you a call back, can they leave a voicemail: [] Yes [] No    Aki Franz MA  05/02/23, 14:54 EDT

## 2023-11-06 ENCOUNTER — OFFICE VISIT (OUTPATIENT)
Dept: BARIATRICS/WEIGHT MGMT | Facility: CLINIC | Age: 68
End: 2023-11-06
Payer: MEDICARE

## 2023-11-06 VITALS
BODY MASS INDEX: 39.69 KG/M2 | DIASTOLIC BLOOD PRESSURE: 80 MMHG | SYSTOLIC BLOOD PRESSURE: 126 MMHG | OXYGEN SATURATION: 98 % | HEIGHT: 70 IN | WEIGHT: 277.2 LBS | HEART RATE: 67 BPM

## 2023-11-06 DIAGNOSIS — R73.09 ABNORMAL GLUCOSE: ICD-10-CM

## 2023-11-06 DIAGNOSIS — R53.83 OTHER FATIGUE: ICD-10-CM

## 2023-11-06 DIAGNOSIS — E55.9 HYPOVITAMINOSIS D: ICD-10-CM

## 2023-11-06 DIAGNOSIS — I10 HYPERTENSION, UNSPECIFIED TYPE: ICD-10-CM

## 2023-11-06 DIAGNOSIS — Z51.81 ENCOUNTER FOR THERAPEUTIC DRUG LEVEL MONITORING: ICD-10-CM

## 2023-11-06 DIAGNOSIS — E66.01 OBESITY, CLASS III, BMI 40-49.9 (MORBID OBESITY): Primary | ICD-10-CM

## 2023-11-06 PROCEDURE — 99214 OFFICE O/P EST MOD 30 MIN: CPT | Performed by: NURSE PRACTITIONER

## 2023-11-06 RX ORDER — DIETHYLPROPION HYDROCHLORIDE 75 MG/1
TABLET ORAL
Qty: 30 EACH | Refills: 2 | Status: SHIPPED | OUTPATIENT
Start: 2023-11-06

## 2023-11-06 NOTE — ASSESSMENT & PLAN NOTE
Hypertension is  stable .  Continue current treatment regimen.  Weight loss.  Regular aerobic exercise.  Blood pressure will be reassessed at the next regular appointment.  No change with diethylpropion.

## 2023-11-06 NOTE — ASSESSMENT & PLAN NOTE
Patient's (Body mass index is 40.35 kg/m².) indicates that they are morbidly/severely obese (BMI > 40 or > 35 with obesity - related health condition) with health conditions that include hypertension, dyslipidemias, GERD, osteoarthritis, and ^LFTs  . Weight is improving with treatment. BMI  is above average; BMI management plan is completed. We discussed low calorie, low carb based diet program, portion control, increasing exercise, pharmacologic options including diethylpropion, and an fritz-based approach such as [employer] Pal or Lose It.     I have instructed the patient to continue with pursuit of medical weight loss as a part of this program. Patient does meet criteria for use of anorectics at this time as BMI >30  and is not at treatment goal.     The current plan for this month includes:   - Continue to prioritize protein, fiber, and hydration.   - Increase physical activity as tolerated.   -Weight loss with medical weight management is now at 35lb. He desires to lose 20 more pounds, this will help with his back pain.   - Restart diethylpropion, continue with 3 month follow up.   - Repeat labwork and UDS today.

## 2023-11-06 NOTE — PROGRESS NOTES
[facility]  [address]  [place] [address]     Office Note      Date: 2023  Patient Name: Marco A Lea Jr.  MRN: [id number]  : 1955  Subjective  Subjective     Chief Complaint  Obesity Management follow-up          Marco A Lea Jr. presents to [facility] for obesity management. s/p LSG 8/6/15 GDW, s/p lap brandy 16 GDW (acalculous cholecystitis). He is here to follow up after 5 months (was due to follow up after 3 months). He has been out of diethylpropion for several weeks, he is more hungry and his weight has increased by about 5lb. He was down to 272lb 2 weeks ago. Reports no side effects of prescribed medications today. The patient is taking multivitamin and is not taking fish oil. He states he still gets full with small portions but without diethylpropion he gets hungry between meals. The patient is using a food journal.   Typical diet:  B: Eggs  L: liver and onions  D: spaghetti  BP lo-135/70-80. Higher when in pain.  The patient is not exercising due to back pain. He is having nerves in his lumbar spine burned in a procedure tomorrow for his back pain.     Review of Systems   Constitutional:  Negative for appetite change and fatigue.   Eyes:  Negative for blurred vision, double vision and visual disturbance.   Cardiovascular:  Negative for chest pain and palpitations.   Gastrointestinal:  Negative for abdominal pain, constipation, diarrhea, nausea, vomiting and GERD.   Endocrine: Negative for polydipsia, polyphagia and polyuria.   Musculoskeletal:  Negative for arthralgias, back pain and myalgias.   Neurological:  Negative for dizziness, tremors, light-headedness, headache and memory problem.        Parasthesias negative   Psychiatric/Behavioral:  Negative for sleep disturbance, depressed mood and stress. The patient is not nervous/anxious.        Objective   Start weight: 312 pounds.    Total Loss  "lb/%Loss of beginning body weight (BBW): -35lb/-11.21%  Change in weight since last visit: -8    Body mass index is 40.35 kg/m².   Body composition analysis completed and showed:   Body Fat %: 54.5    Measurements (in inches)  Waist Circumference: 50.25      Vital Signs:   /80   Pulse 67   Ht 176.5 cm (69.5\")   Wt 126 kg (277 lb 3.2 oz)   SpO2 98%   BMI 40.35 kg/m²     Physical Exam   General appears stated age and normal appearance   HEENT PERRLA, EOM intact, and conjunctivae normal   Chest/lungs Normal rate, Regular rhythm, and Breathing is unlabored   Extremities without edema,    Neuro Good historian and No focal deficit   Skin Warm, dry, intact   Psych normal behavior, normal thought content, and normal concentration     Result Review :                Assessment / Plan        Diagnoses and all orders for this visit:    1. Obesity, Class III, BMI 40-49.9 (morbid obesity) (Primary)  Assessment & Plan:  Patient's (Body mass index is 40.35 kg/m².) indicates that they are morbidly/severely obese (BMI > 40 or > 35 with obesity - related health condition) with health conditions that include hypertension, dyslipidemias, GERD, osteoarthritis, and ^LFTs  . Weight is improving with treatment. BMI  is above average; BMI management plan is completed. We discussed low calorie, low carb based diet program, portion control, increasing exercise, pharmacologic options including diethylpropion, and an fritz-based approach such as [employer] Pal or Lose It.     I have instructed the patient to continue with pursuit of medical weight loss as a part of this program. Patient does meet criteria for use of anorectics at this time as BMI >30  and is not at treatment goal.     The current plan for this month includes:   - Continue to prioritize protein, fiber, and hydration.   - Increase physical activity as tolerated.   -Weight loss with medical weight management is now at 35lb. He desires to lose 20 more pounds, this will help " with his back pain.   - Restart diethylpropion, continue with 3 month follow up.   - Repeat labwork and UDS today.       Orders:  -     Diethylpropion HCl ER 75 MG tablet sustained-release 24 hour; Take one tablet at 11 am.  Dispense: 30 each; Refill: 2  -     Cancel: Hemoglobin A1c  -     Cancel: Lipid Panel  -     Cancel: TSH  -     Cancel: Comprehensive Metabolic Panel  -     Cancel: CBC (No Diff)  -     Cancel: Vitamin D,25-Hydroxy  -     Cancel: TSH  -     Cancel: Vitamin D,25-Hydroxy  -     CBC (No Diff); Future  -     Comprehensive Metabolic Panel; Future  -     Hemoglobin A1c; Future  -     Lipid Panel; Future  -     TSH; Future  -     Vitamin D,25-Hydroxy; Future    2. Abnormal glucose  Assessment & Plan:  Repeat A1c and fasting glucose this month.     Orders:  -     Cancel: Hemoglobin A1c  -     Cancel: Comprehensive Metabolic Panel  -     Cancel: Vitamin D,25-Hydroxy  -     Comprehensive Metabolic Panel; Future  -     Hemoglobin A1c; Future  -     Vitamin D,25-Hydroxy; Future    3. Hypertension, unspecified type  Assessment & Plan:  Hypertension is  stable .  Continue current treatment regimen.  Weight loss.  Regular aerobic exercise.  Blood pressure will be reassessed at the next regular appointment.  No change with diethylpropion.     Orders:  -     Cancel: TSH  -     TSH; Future    4. Hypovitaminosis D  -     Cancel: Vitamin D,25-Hydroxy  -     Vitamin D,25-Hydroxy; Future    5. Other fatigue  -     Cancel: TSH  -     TSH; Future    6. Encounter for therapeutic drug level monitoring  -     Urine Drug Screen - Urine, Clean Catch          Follow Up   Return in about 3 months (around 2/6/2024) for Next scheduled follow up, Labs before next visit, in office.  Patient was given instructions and counseling regarding his condition or for health maintenance advice. Please see specific information pulled into the AVS if appropriate.     Luci Uriostegui, APRN  11/06/2023

## 2023-11-07 LAB
AMPHETAMINES UR QL SCN: NEGATIVE NG/ML
BARBITURATES UR QL SCN: NEGATIVE NG/ML
BENZODIAZ UR QL SCN: NEGATIVE NG/ML
BZE UR QL SCN: NEGATIVE NG/ML
CANNABINOIDS UR QL SCN: NEGATIVE NG/ML
CREAT UR-MCNC: 129.8 MG/DL (ref 20–300)
LABORATORY COMMENT REPORT: ABNORMAL
METHADONE UR QL SCN: NEGATIVE NG/ML
OPIATES UR QL SCN: POSITIVE NG/ML
OXYCODONE+OXYMORPHONE UR QL SCN: POSITIVE NG/ML
PCP UR QL: NEGATIVE NG/ML
PH UR: 5.7 [PH] (ref 4.5–8.9)
PROPOXYPH UR QL SCN: NEGATIVE NG/ML

## 2024-02-05 ENCOUNTER — OFFICE VISIT (OUTPATIENT)
Dept: BARIATRICS/WEIGHT MGMT | Facility: CLINIC | Age: 69
End: 2024-02-05
Payer: COMMERCIAL

## 2024-02-05 VITALS
SYSTOLIC BLOOD PRESSURE: 120 MMHG | DIASTOLIC BLOOD PRESSURE: 82 MMHG | HEIGHT: 70 IN | BODY MASS INDEX: 39.48 KG/M2 | HEART RATE: 63 BPM | WEIGHT: 275.8 LBS

## 2024-02-05 DIAGNOSIS — E66.01 OBESITY, CLASS III, BMI 40-49.9 (MORBID OBESITY): Primary | ICD-10-CM

## 2024-02-05 DIAGNOSIS — F32.9 REACTIVE DEPRESSION: ICD-10-CM

## 2024-02-05 DIAGNOSIS — I10 HYPERTENSION, UNSPECIFIED TYPE: ICD-10-CM

## 2024-02-05 DIAGNOSIS — E78.5 HYPERLIPIDEMIA, UNSPECIFIED HYPERLIPIDEMIA TYPE: ICD-10-CM

## 2024-02-05 PROCEDURE — 99214 OFFICE O/P EST MOD 30 MIN: CPT | Performed by: NURSE PRACTITIONER

## 2024-02-05 RX ORDER — DIETHYLPROPION HYDROCHLORIDE 75 MG/1
TABLET ORAL
Qty: 30 EACH | Refills: 2 | Status: SHIPPED | OUTPATIENT
Start: 2024-02-05

## 2024-02-05 RX ORDER — CHLORAL HYDRATE 500 MG
1000 CAPSULE ORAL 2 TIMES DAILY WITH MEALS
Start: 2024-02-05

## 2024-02-05 NOTE — ASSESSMENT & PLAN NOTE
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 Their depression is currently active and the condition is worsening due to wife's recent cancer diagnosis. This will be reassessed at the next regular appointment. F/U as described:patient will continue current medication therapy and add back regular exercise (walking) . Discussed benefits of therapy, patient not interested at this time. Has a  he can meet with if needed.

## 2024-02-05 NOTE — ASSESSMENT & PLAN NOTE
Patient's (Body mass index is 40.14 kg/m².) indicates that they are morbidly/severely obese (BMI > 40 or > 35 with obesity - related health condition) with health conditions that include hypertension, dyslipidemias, osteoarthritis, and prediabetes  . Weight is worsening. BMI  is above average; BMI management plan is completed. We discussed low calorie, low carb based diet program, portion control, increasing exercise, joining a fitness center or start home based exercise program, management of depression/anxiety/stress to control compensatory eating, pharmacologic options including diehtylpropion, and an fritz-based approach such as [employer] Pal or Lose It.     I have instructed the patient to continue with pursuit of medical weight loss as a part of this program. Patient does meet criteria for use of anorectics at this time as BMI >30 , patient has failed taper of anorectics at least 2 times & the use of anorectic medication is allowing patient to maintain loss of 20% beginning body weight, and is not at treatment goal.     The current plan for this month includes:   - Continue to work on lifestyle behavioral changes  - Continue to prioritize protein, fiber, and hydration. Continue to avoid stress eating.   - Restart exercise.

## 2024-02-05 NOTE — PROGRESS NOTES
[facility]  [address]  [place] [address]     Office Note      Date: 2024  Patient Name: Marco A Lea Jr.  MRN: [id number]  : 1955  Subjective  Subjective     Chief Complaint  Obesity Management follow-up          Marco A Lea Jr. presents to [facility] for obesity management. s/p LSG 8/6/15 GDW, s/p lap brandy 16 GDW (acalculous cholecystitis).  Presurgery weight: 352 lbs.  Lowest weight: 265 lbs.   Patient is unsatisfied with weight loss progress. Appetite is well controlled. He is disappointed in himself for losing focus. He's had a lot going on since last visit. He has had two back procedures which have helped some with his pain. His wife was diagnosed with thyroid cancer last week, she has also had some problems with her heart. His work is slow this time of year so he has not been as active. Not walking like he had been either. Reports no side effects of prescribed medications today. The patient is taking multivitamin and is not taking fish oil. The patient is using a food journal.    24 hour recall:  D: bowl of noodles  L: nuris ambrosio  B: coffee, milk    Review of Systems   Constitutional:  Negative for appetite change and fatigue.   Eyes:  Negative for visual disturbance.   Cardiovascular:  Negative for chest pain and palpitations.   Gastrointestinal:  Negative for constipation and indigestion.   Neurological:  Negative for light-headedness.   All other systems reviewed and are negative.        Current Outpatient Medications:     buPROPion (WELLBUTRIN) 75 MG tablet, bupropion HCl 75 mg tablet, Disp: , Rfl:     CloNIDine (CATAPRES) 0.1 MG tablet, Take 1 tablet by mouth 2 (Two) Times a Day As Needed., Disp: , Rfl:     cyclobenzaprine (FLEXERIL) 10 MG tablet, Take 1 tablet by mouth 2 (Two) Times a Day As Needed., Disp: , Rfl:     Diethylpropion HCl ER 75 MG tablet sustained-release 24 hour, Take one  tablet at 11 am., Disp: 30 each, Rfl: 2    fluticasone (FLONASE) 50 MCG/ACT nasal spray, , Disp: , Rfl:     ketorolac (TORADOL) 10 MG tablet, Take 1 tablet by mouth Every 6 (Six) Hours As Needed., Disp: , Rfl:     levocetirizine (XYZAL) 5 MG tablet, levocetirizine 5 mg tablet, Disp: , Rfl:     lidocaine (LIDODERM) 5 %, Place 1 patch on the skin as directed by provider Daily., Disp: , Rfl:     meclizine (ANTIVERT) 25 MG tablet, Take 1 tablet by mouth 3 (Three) Times a Day As Needed., Disp: , Rfl:     naproxen (EC NAPROSYN) 500 MG EC tablet, Take 1 tablet by mouth 2 (Two) Times a Day With Meals., Disp: , Rfl:     omeprazole (priLOSEC) 40 MG capsule, , Disp: , Rfl:     ondansetron (ZOFRAN) 4 MG tablet, Take 1 tablet by mouth Every 8 (Eight) Hours As Needed for nausea or vomiting., Disp: 20 tablet, Rfl: 0    oxyCODONE-acetaminophen (PERCOCET)  MG per tablet, Take 1 tablet by mouth Every 6 (Six) Hours As Needed., Disp: , Rfl:     propranolol (INDERAL) 40 MG tablet, Take 1 tablet by mouth Daily., Disp: , Rfl:     tamsulosin (FLOMAX) 0.4 MG capsule 24 hr capsule, , Disp: , Rfl:     valsartan-hydrochlorothiazide (DIOVAN-HCT) 320-25 MG per tablet, , Disp: , Rfl:     vitamin B-12 (CYANOCOBALAMIN) 1000 MCG tablet, Take 1 tablet by mouth Daily., Disp: , Rfl:     vitamin D3 125 MCG (5000 UT) capsule capsule, Take 1 capsule by mouth Daily., Disp: , Rfl:     Zinc Sulfate (ZINC 15 PO), Take  by mouth., Disp: , Rfl:     Omega-3 Fatty Acids (fish oil) 1000 MG capsule capsule, Take 1 capsule by mouth 2 (Two) Times a Day With Meals., Disp: , Rfl:     Objective   Start weight: 312 pounds.    Total Loss lb/%Loss of beginning body weight (BBW): -36.2 lb/+20.13 %  Change in weight since last visit: +48.6    Body mass index is 40.14 kg/m².   Body composition analysis completed and showed:   Body Fat %: 51.5    Measurements (in inches)  Waist Circumference: 55      Vital Signs:   /82 (BP Location: Left arm, Patient Position:  "Sitting)   Pulse 63   Ht 176.5 cm (69.5\")   Wt 125 kg (275 lb 12.8 oz)   BMI 40.14 kg/m²     Physical Exam   General appears stated age and normal appearance   HEENT PERRLA, EOM intact, and conjunctivae normal   Chest/lungs Normal rate, Regular rhythm, and Breathing is unlabored   Extremities without edema   Neuro Good historian and No focal deficit   Skin Warm, dry, intact   Psych normal behavior, normal thought content, and normal concentration     Result Review :   The following data was reviewed by: WINIFRED Lechuga on 02/05/2024:  Labs ordered in November by me, see attached.              Assessment / Plan        Diagnoses and all orders for this visit:    1. Obesity, Class III, BMI 40-49.9 (morbid obesity) (Primary)  Assessment & Plan:  Patient's (Body mass index is 40.14 kg/m².) indicates that they are morbidly/severely obese (BMI > 40 or > 35 with obesity - related health condition) with health conditions that include hypertension, dyslipidemias, osteoarthritis, and prediabetes  . Weight is worsening. BMI  is above average; BMI management plan is completed. We discussed low calorie, low carb based diet program, portion control, increasing exercise, joining a fitness center or start home based exercise program, management of depression/anxiety/stress to control compensatory eating, pharmacologic options including diehtylpropion, and an fritz-based approach such as [employer] Pal or Lose It.     I have instructed the patient to continue with pursuit of medical weight loss as a part of this program. Patient does meet criteria for use of anorectics at this time as BMI >30 , patient has failed taper of anorectics at least 2 times & the use of anorectic medication is allowing patient to maintain loss of 20% beginning body weight, and is not at treatment goal.     The current plan for this month includes:   - Continue to work on lifestyle behavioral changes  - Continue to prioritize protein, fiber, and " hydration. Continue to avoid stress eating.   - Restart exercise.      Orders:  -     Diethylpropion HCl ER 75 MG tablet sustained-release 24 hour; Take one tablet at 11 am.  Dispense: 30 each; Refill: 2  -     Omega-3 Fatty Acids (fish oil) 1000 MG capsule capsule; Take 1 capsule by mouth 2 (Two) Times a Day With Meals.    2. Reactive depression  Assessment & Plan:  Patient's depression is recurrent and is moderate without psychosis. Their depression is currently active and the condition is worsening due to wife's recent cancer diagnosis. This will be reassessed at the next regular appointment. F/U as described:patient will continue current medication therapy and add back regular exercise (walking) . Discussed benefits of therapy, patient not interested at this time. Has a  he can meet with if needed.       3. Hyperlipidemia, unspecified hyperlipidemia type  Assessment & Plan:  Lipid abnormalities are unchanged.  Nutritional counseling was provided. Start fish oil for elevated triglycerides.   Lipids will be reassessed in 1 year.    Orders:  -     Omega-3 Fatty Acids (fish oil) 1000 MG capsule capsule; Take 1 capsule by mouth 2 (Two) Times a Day With Meals.    4. Hypertension, unspecified type  Assessment & Plan:  Hypertension is improving with treatment.  Continue current treatment regimen.  Weight loss.  Regular aerobic exercise.  Continue current medications.  Blood pressure will be reassessed at the next regular appointment.            Follow Up   Return in about 3 months (around 5/5/2024) for Next scheduled follow up, in office.  Patient was given instructions and counseling regarding his condition or for health maintenance advice. Please see specific information pulled into the AVS if appropriate.     Luci Uriostegui, WINIFRED  02/05/2024

## 2024-02-05 NOTE — ASSESSMENT & PLAN NOTE
Lipid abnormalities are unchanged.  Nutritional counseling was provided. Start fish oil for elevated triglycerides.   Lipids will be reassessed in 1 year.

## 2024-05-09 ENCOUNTER — TELEMEDICINE (OUTPATIENT)
Dept: BARIATRICS/WEIGHT MGMT | Facility: CLINIC | Age: 69
End: 2024-05-09
Payer: MEDICARE

## 2024-05-09 VITALS
HEART RATE: 60 BPM | HEIGHT: 70 IN | SYSTOLIC BLOOD PRESSURE: 123 MMHG | BODY MASS INDEX: 39.37 KG/M2 | WEIGHT: 275 LBS | DIASTOLIC BLOOD PRESSURE: 83 MMHG

## 2024-05-09 DIAGNOSIS — E66.01 OBESITY, CLASS III, BMI 40-49.9 (MORBID OBESITY): Primary | ICD-10-CM

## 2024-05-09 DIAGNOSIS — I10 HYPERTENSION, UNSPECIFIED TYPE: ICD-10-CM

## 2024-05-09 RX ORDER — DIETHYLPROPION HYDROCHLORIDE 75 MG/1
TABLET ORAL
Qty: 30 EACH | Refills: 2 | Status: SHIPPED | OUTPATIENT
Start: 2024-05-09

## 2024-08-28 ENCOUNTER — OFFICE VISIT (OUTPATIENT)
Dept: BARIATRICS/WEIGHT MGMT | Facility: CLINIC | Age: 69
End: 2024-08-28
Payer: MEDICARE

## 2024-08-28 VITALS
RESPIRATION RATE: 18 BRPM | SYSTOLIC BLOOD PRESSURE: 126 MMHG | HEIGHT: 70 IN | BODY MASS INDEX: 39.25 KG/M2 | HEART RATE: 84 BPM | OXYGEN SATURATION: 99 % | WEIGHT: 274.2 LBS | DIASTOLIC BLOOD PRESSURE: 76 MMHG

## 2024-08-28 DIAGNOSIS — R53.83 OTHER FATIGUE: ICD-10-CM

## 2024-08-28 DIAGNOSIS — E66.01 CLASS 2 SEVERE OBESITY WITH SERIOUS COMORBIDITY AND BODY MASS INDEX (BMI) OF 39.0 TO 39.9 IN ADULT, UNSPECIFIED OBESITY TYPE: Primary | ICD-10-CM

## 2024-08-28 DIAGNOSIS — R11.0 NAUSEA: ICD-10-CM

## 2024-08-28 DIAGNOSIS — R73.09 ABNORMAL GLUCOSE: ICD-10-CM

## 2024-08-28 RX ORDER — DIETHYLPROPION HYDROCHLORIDE 75 MG/1
TABLET, EXTENDED RELEASE ORAL
Qty: 30 EACH | Refills: 2 | Status: SHIPPED | OUTPATIENT
Start: 2024-08-28

## 2024-08-28 RX ORDER — CYANOCOBALAMIN 1000 UG/ML
1000 INJECTION, SOLUTION INTRAMUSCULAR; SUBCUTANEOUS ONCE
Status: COMPLETED | OUTPATIENT
Start: 2024-08-28 | End: 2024-08-28

## 2024-08-28 RX ADMIN — CYANOCOBALAMIN 1000 MCG: 1000 INJECTION, SOLUTION INTRAMUSCULAR; SUBCUTANEOUS at 12:43

## 2024-08-28 NOTE — ASSESSMENT & PLAN NOTE
Patient's (Body mass index is 39.91 kg/m².) indicates that they are morbidly/severely obese (BMI > 40 or > 35 with obesity - related health condition) with health conditions that include hypertension, impaired fasting glucose, dyslipidemias, GERD, and osteoarthritis . Weight is improving with treatment. BMI  is above average; BMI management plan is completed. We discussed low calorie, low carb based diet program, portion control, increasing exercise, management of depression/anxiety/stress to control compensatory eating, pharmacologic options including diethylpropion, ozempic, and an fritz-based approach such as [employer] Pal or Lose It.     I have instructed the patient to continue with pursuit of medical weight loss as a part of this program. Patient does meet criteria for use of anorectics at this time as BMI >30  and is not at treatment goal.     The current plan for this month includes:   - Adjust exercise as discussed  - Continue to prioritize protein, fiber, and hydration.   - Avoid skipping meals, do protein shakes instead since they do not cause him diarrhea  - Continue diethylpropion, keeps his appetite controlled and he is maintaining 12% loss of beginning body weight.   -  Start ozempic, unsure if it requires PA. Denies family or personal history of pancreatitis, MTC, or MEN 2. Discussed common side effects of nausea, diarrhea, vomiting, constipation, stomach pain, headache, fatigue, upset stomach, dizziness, feeling bloated, belching, gas, stomach flu, heartburn.

## 2024-08-28 NOTE — ASSESSMENT & PLAN NOTE
Denies hypoglycemia. Continue low carb diet, regular physical activity, and weight reduction of 10-15%. Start ozempic.

## 2024-08-28 NOTE — PROGRESS NOTES
[facility]  [address]  [place] [address]     Office Note      Date: 2024  Patient Name: Marco A Lea Jr.  MRN: [id number]  : 1955  Subjective  Subjective     Chief Complaint  Obesity Management follow-up          Marco A Lea Jr. presents to [facility] for obesity management. s/p LSG 8/6/15 GDW, s/p lap brandy 16 GDW (acalculous cholecystitis). Presurgery weight: 352 lbs.  Lowest weight: 265 lbs.   Patient is satisfied with weight loss progress. Appetite is well controlled, doesn't really have an appetite. Reports no side effects of prescribed medications today. The patient is taking multivitamin and is not taking fish oil.  The patient is using a food journal.  His wife has crohns disease so he doesn't eat many carbs. Had nerves in his back cauderized since last visit with Dr. Laureano Gramajo. Now having muscle pain in his back, got injection recently. Having frequent nausea after eating and sometimes diarrhea. He skips meals if he is going to be in an environment without a bathroom.   The patient is exercising with a FITT score of:    Frequency Intensity Time Strength Training   []   0, none []   0 []   0 [x]   0   []   1 (1-2x/week) []   1 (light) []   1 (<10 min) []   1 (1x/week)   []   2 (3-5x/week) [x]   2 (moderate) []   2 (10-20 min) []   2 (2x/week)   [x]   3 (daily) []   3 (moderately hard)  []   4 (very hard) []   3 (20-30 min)  [x]   4 (>30 min) []   3 (3-4x/week)       24 hour recall:  Breakfast: eggs and toast  Dinner: chinese chicken and vegetables    Review of Systems   Constitutional:  Negative for appetite change and fatigue.   Eyes:  Negative for visual disturbance.   Cardiovascular:  Negative for chest pain and palpitations.   Gastrointestinal:  Positive for nausea. Negative for constipation and indigestion.   Neurological:  Negative for light-headedness.       Current Outpatient Medications:      buPROPion (WELLBUTRIN) 75 MG tablet, bupropion HCl 75 mg tablet, Disp: , Rfl:     CloNIDine (CATAPRES) 0.1 MG tablet, Take 1 tablet by mouth 2 (Two) Times a Day As Needed., Disp: , Rfl:     cyclobenzaprine (FLEXERIL) 10 MG tablet, Take 1 tablet by mouth 2 (Two) Times a Day As Needed., Disp: , Rfl:     Diethylpropion HCl ER 75 MG tablet sustained-release 24 hour, Take one tablet at 11 am., Disp: 30 each, Rfl: 2    fluticasone (FLONASE) 50 MCG/ACT nasal spray, , Disp: , Rfl:     ketorolac (TORADOL) 10 MG tablet, Take 1 tablet by mouth Every 6 (Six) Hours As Needed., Disp: , Rfl:     levocetirizine (XYZAL) 5 MG tablet, levocetirizine 5 mg tablet, Disp: , Rfl:     lidocaine (LIDODERM) 5 %, Place 1 patch on the skin as directed by provider Daily., Disp: , Rfl:     meclizine (ANTIVERT) 25 MG tablet, Take 1 tablet by mouth 3 (Three) Times a Day As Needed., Disp: , Rfl:     naproxen (EC NAPROSYN) 500 MG EC tablet, Take 1 tablet by mouth 2 (Two) Times a Day With Meals., Disp: , Rfl:     Omega-3 Fatty Acids (fish oil) 1000 MG capsule capsule, Take 1 capsule by mouth 2 (Two) Times a Day With Meals., Disp: , Rfl:     omeprazole (priLOSEC) 40 MG capsule, , Disp: , Rfl:     ondansetron (ZOFRAN) 4 MG tablet, Take 1 tablet by mouth Every 8 (Eight) Hours As Needed for nausea or vomiting., Disp: 20 tablet, Rfl: 0    oxyCODONE-acetaminophen (PERCOCET)  MG per tablet, Take 1 tablet by mouth Every 6 (Six) Hours As Needed., Disp: , Rfl:     propranolol (INDERAL) 40 MG tablet, Take 1 tablet by mouth Daily., Disp: , Rfl:     tamsulosin (FLOMAX) 0.4 MG capsule 24 hr capsule, , Disp: , Rfl:     tiZANidine (ZANAFLEX) 4 MG tablet, Take 1 tablet by mouth Every 8 (Eight) Hours As Needed., Disp: , Rfl:     valsartan-hydrochlorothiazide (DIOVAN-HCT) 320-25 MG per tablet, , Disp: , Rfl:     vitamin B-12 (CYANOCOBALAMIN) 1000 MCG tablet, Take 1 tablet by mouth Daily., Disp: , Rfl:     vitamin D3 125 MCG (5000 UT) capsule capsule, Take 1  "capsule by mouth Daily., Disp: , Rfl:     Zinc Sulfate (ZINC 15 PO), Take  by mouth., Disp: , Rfl:     Semaglutide,0.25 or 0.5MG/DOS, (OZEMPIC) 2 MG/3ML solution pen-injector, Inject 0.25 mg under the skin into the appropriate area as directed 1 (One) Time Per Week for 28 days, THEN 0.5 mg 1 (One) Time Per Week for 14 days., Disp: 3 mL, Rfl: 1  No current facility-administered medications for this visit.    Objective   Start weight: 312 pounds.    Total Loss lb/%Loss of beginning body weight (BBW): -37.8lb/-12.12%  Change in weight since last visit: -0.8    Body mass index is 39.91 kg/m².   Body composition analysis completed and showed:   Body Fat %: 59.0%    Measurements (in inches)  Waist Circumference: 50      Vital Signs:   /76   Pulse 84   Resp 18   Ht 176.5 cm (69.5\")   Wt 124 kg (274 lb 3.2 oz)   SpO2 99%   BMI 39.91 kg/m²     No LMP for male patient.    Physical Exam   General appears stated age and normal appearance   HEENT PERRLA, EOM intact, and conjunctivae normal   Chest/lungs Normal rate, Regular rhythm, and Breathing is unlabored   Extremities without edema   Neuro Good historian and No focal deficit   Skin Warm, dry, intact   Psych normal behavior, normal thought content, and normal concentration     Result Review :                Assessment / Plan        Diagnoses and all orders for this visit:    1. Class 2 severe obesity with serious comorbidity and body mass index (BMI) of 39.0 to 39.9 in adult, unspecified obesity type (Primary)  Assessment & Plan:  Patient's (Body mass index is 39.91 kg/m².) indicates that they are morbidly/severely obese (BMI > 40 or > 35 with obesity - related health condition) with health conditions that include hypertension, impaired fasting glucose, dyslipidemias, GERD, and osteoarthritis . Weight is improving with treatment. BMI  is above average; BMI management plan is completed. We discussed low calorie, low carb based diet program, portion control, " increasing exercise, management of depression/anxiety/stress to control compensatory eating, pharmacologic options including diethylpropion, ozempic, and an fritz-based approach such as [employer] Pal or Lose It.     I have instructed the patient to continue with pursuit of medical weight loss as a part of this program. Patient does meet criteria for use of anorectics at this time as BMI >30  and is not at treatment goal.     The current plan for this month includes:   - Adjust exercise as discussed  - Continue to prioritize protein, fiber, and hydration.   - Avoid skipping meals, do protein shakes instead since they do not cause him diarrhea  - Continue diethylpropion, keeps his appetite controlled and he is maintaining 12% loss of beginning body weight.   -  Start ozempic, unsure if it requires PA. Denies family or personal history of pancreatitis, MTC, or MEN 2. Discussed common side effects of nausea, diarrhea, vomiting, constipation, stomach pain, headache, fatigue, upset stomach, dizziness, feeling bloated, belching, gas, stomach flu, heartburn.           Orders:  -     Semaglutide,0.25 or 0.5MG/DOS, (OZEMPIC) 2 MG/3ML solution pen-injector; Inject 0.25 mg under the skin into the appropriate area as directed 1 (One) Time Per Week for 28 days, THEN 0.5 mg 1 (One) Time Per Week for 14 days.  Dispense: 3 mL; Refill: 1  -     Diethylpropion HCl ER 75 MG tablet sustained-release 24 hour; Take one tablet at 11 am.  Dispense: 30 each; Refill: 2    2. Abnormal glucose  Assessment & Plan:  Denies hypoglycemia. Continue low carb diet, regular physical activity, and weight reduction of 10-15%. Start ozempic.         3. Nausea  Assessment & Plan:  Advised to discuss with PCP, not medication related. Feeling nauseated with meals, has lost his appetite. Will have routine labs in November with physical.       4. Other fatigue  -     cyanocobalamin injection 1,000 mcg          Follow Up   Return in about 4 weeks (around  9/25/2024) for Next scheduled follow up.  Patient was given instructions and counseling regarding his condition or for health maintenance advice. Please see specific information pulled into the AVS if appropriate.     WINIFRED Cardoso  08/28/2024

## 2024-08-28 NOTE — ASSESSMENT & PLAN NOTE
Advised to discuss with PCP, not medication related. Feeling nauseated with meals, has lost his appetite. Will have routine labs in November with physical.

## 2024-10-23 ENCOUNTER — TELEMEDICINE (OUTPATIENT)
Dept: BARIATRICS/WEIGHT MGMT | Facility: CLINIC | Age: 69
End: 2024-10-23
Payer: MEDICARE

## 2024-10-23 VITALS
DIASTOLIC BLOOD PRESSURE: 82 MMHG | SYSTOLIC BLOOD PRESSURE: 122 MMHG | BODY MASS INDEX: 40.03 KG/M2 | WEIGHT: 275 LBS | HEART RATE: 72 BPM

## 2024-10-23 DIAGNOSIS — E66.01 CLASS 3 SEVERE OBESITY WITH SERIOUS COMORBIDITY AND BODY MASS INDEX (BMI) OF 40.0 TO 44.9 IN ADULT, UNSPECIFIED OBESITY TYPE: Primary | ICD-10-CM

## 2024-10-23 DIAGNOSIS — E66.813 CLASS 3 SEVERE OBESITY WITH SERIOUS COMORBIDITY AND BODY MASS INDEX (BMI) OF 40.0 TO 44.9 IN ADULT, UNSPECIFIED OBESITY TYPE: Primary | ICD-10-CM

## 2024-10-23 DIAGNOSIS — I10 HYPERTENSION, UNSPECIFIED TYPE: ICD-10-CM

## 2024-10-23 PROCEDURE — 1159F MED LIST DOCD IN RCRD: CPT | Performed by: NURSE PRACTITIONER

## 2024-10-23 PROCEDURE — 3079F DIAST BP 80-89 MM HG: CPT | Performed by: NURSE PRACTITIONER

## 2024-10-23 PROCEDURE — 1160F RVW MEDS BY RX/DR IN RCRD: CPT | Performed by: NURSE PRACTITIONER

## 2024-10-23 PROCEDURE — 99214 OFFICE O/P EST MOD 30 MIN: CPT | Performed by: NURSE PRACTITIONER

## 2024-10-23 PROCEDURE — 3074F SYST BP LT 130 MM HG: CPT | Performed by: NURSE PRACTITIONER

## 2024-10-23 RX ORDER — DIETHYLPROPION HYDROCHLORIDE 75 MG/1
TABLET, EXTENDED RELEASE ORAL
Qty: 30 EACH | Refills: 2 | Status: SHIPPED | OUTPATIENT
Start: 2024-10-30

## 2024-10-23 NOTE — ASSESSMENT & PLAN NOTE
Patient's (Body mass index is 40.03 kg/m².) indicates that they are morbidly/severely obese (BMI > 40 or > 35 with obesity - related health condition) with health conditions that include hypertension, impaired fasting glucose, dyslipidemias, GERD, and osteoarthritis . Weight is improving with treatment. BMI  is above average; BMI management plan is completed. We discussed low calorie, low carb based diet program, portion control, increasing exercise, management of depression/anxiety/stress to control compensatory eating, pharmacologic options including diethylpropion, and an fritz-based approach such as [employer] Pal or Lose It.     I have instructed the patient to continue with pursuit of medical weight loss as a part of this program. Patient does meet criteria for use of anorectics at this time as BMI >30  and is not at treatment goal.     The current plan for this month includes:   - Continue to prioritize protein, fiber, and hydration.   - Continue current AOM, GLP-1 not affordable at this time, will consider in the future if it is on formulary.   - Treatment goal 250lb.     Continue sympathomimetic (medication refilled).  This patient 1) has no evidence of serious cardiovascular disease; 2) does not have serious psychiatric disease or a history of substance abuse; 3) has been informed about weight loss medications that are FDA approved for long-term use and told that these have been all documented to be safe and effective whereas phentermine has not; 4) does not demonstrate a clinically significant increase in pulse or blood pressure when taking phentermine; and 5) demonstrates significant weight loss while using the medication.  Patient understands that all antiobesity medications are contraindicated in pregnancy.  Patient denies a history of glaucoma.  Patient understands that long-term use of phentermine is considered off label use of this medication, however, that the endocrine Society and recent research  "supports that long-term use of phentermine does not appear to have detrimental health effects when used on the appropriate patient.  In addition, a 2019 study published in an obesity journal on [id number] patient's concluded that \"recommendations to limit phentermine to less than 3 months do not align with current concept of pharmacologic treatment of obesity\", and that \"long-term phentermine users experience greater weight loss without apparent increases in cardiovascular risk\".    We reviewed potential side effects including insomnia, dry mouth, increased heart rate and blood pressure, increased anxiety.  We reviewed reducing caffeine consumption while taking phentermine.  especially if the patient experiences side effects.  The potential risk and benefits of phentermine have been reviewed with the patient, and alternative treatment options were discussed.  All questions were answered, and the patient wishes to move forward with this medication.      "

## 2024-10-23 NOTE — PROGRESS NOTES
Office Note      Date: 10/23/2024  Patient Name: Marco A Lea Jr.  MRN: [id number]  : 1955    This service was conducted via [employer].  Patient is located [place], [place].  Provider is located at her home address. The use of a video visit has been reviewed with the patient and informed consent has been obtained.  Subjective  Subjective     Chief Complaint  Obesity Management follow-up    Subjective          Marco A Lea Jr. presents to [facility] via telehealth for obesity management.     Patient is satisfied with weight loss progress. Appetite is well controlled. Reports no side effects of prescribed medications today.   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 Has been having back spasms again, getting injections in his back to help.        Review of Systems   Constitutional:  Negative for appetite change and fatigue.   Eyes:  Negative for visual disturbance.   Cardiovascular:  Negative for chest pain and palpitations.   Gastrointestinal:  Negative for constipation and indigestion.   Neurological:  Negative for light-headedness.     Objective   Start weight: 312 pounds.    Total weight loss: -37 pounds/-11.85%  Change in weight since last visit: +0.7    Body mass index is 40.03 kg/m².   Measurements (in inches)     /82   Pulse 72   Wt 125 kg (275 lb)   BMI 40.03 kg/m²       Result Review :                 Assessment and Plan    Diagnoses and all orders for this visit:    1. Class 3 severe obesity with serious comorbidity and body mass index (BMI) of 40.0 to 44.9 in adult, unspecified obesity type (Primary)  Assessment & Plan:  Patient's (Body mass index is 40.03 kg/m².) indicates that they are morbidly/severely obese (BMI > 40 or > 35  with obesity - related health condition) with health conditions that include hypertension, impaired fasting glucose, dyslipidemias, GERD, and osteoarthritis . Weight is improving with treatment. BMI  is above average; BMI management plan is completed. We discussed low calorie, low carb based diet program, portion control, increasing exercise, management of depression/anxiety/stress to control compensatory eating, pharmacologic options including diethylpropion, and an fritz-based approach such as What[employer]€™s On Foodie Pal or Lose It.     I have instructed the patient to continue with pursuit of medical weight loss as a part of this program. Patient does meet criteria for use of anorectics at this time as BMI >30  and is not at treatment goal.     The current plan for this month includes:   - Continue to prioritize protein, fiber, and hydration.   - Continue current AOM, GLP-1 not affordable at this time, will consider in the future if it is on formulary.   - Treatment goal 250lb.     Continue sympathomimetic (medication refilled).  This patient 1) has no evidence of serious cardiovascular disease; 2) does not have serious psychiatric disease or a history of substance abuse; 3) has been informed about weight loss medications that are FDA approved for long-term use and told that these have been all documented to be safe and effective whereas phentermine has not; 4) does not demonstrate a clinically significant increase in pulse or blood pressure when taking phentermine; and 5) demonstrates significant weight loss while using the medication.  Patient understands that all antiobesity medications are contraindicated in pregnancy.  Patient denies a history of glaucoma.  Patient understands that long-term use of phentermine is considered off label use of this medication, however, that the endocrine Society and recent research supports that long-term use of phentermine does not appear to have detrimental health effects when used on the  "appropriate patient.  In addition, a 2019 study published in an obesity journal on [id number] patient's concluded that \"recommendations to limit phentermine to less than 3 months do not align with current concept of pharmacologic treatment of obesity\", and that \"long-term phentermine users experience greater weight loss without apparent increases in cardiovascular risk\".    We reviewed potential side effects including insomnia, dry mouth, increased heart rate and blood pressure, increased anxiety.  We reviewed reducing caffeine consumption while taking phentermine.  especially if the patient experiences side effects.  The potential risk and benefits of phentermine have been reviewed with the patient, and alternative treatment options were discussed.  All questions were answered, and the patient wishes to move forward with this medication.        Orders:  -     Diethylpropion HCl ER 75 MG tablet sustained-release 24 hour; Take one tablet at 11 am.  Dispense: 30 each; Refill: 2    2. Hypertension, unspecified type  Assessment & Plan:  Hypertension is stable and controlled  Continue current treatment regimen.  Weight loss.  Regular aerobic exercise.  Ambulatory blood pressure monitoring.  Blood pressure will be reassessed in 3 months.            Follow Up   Return in about 3 months (around 1/23/2025) for in office.  Patient was given instructions and counseling regarding his condition or for health maintenance advice. Please see specific information pulled into the AVS if appropriate.     WINIFRED Cardoso  "

## 2025-03-19 ENCOUNTER — OFFICE VISIT (OUTPATIENT)
Dept: BARIATRICS/WEIGHT MGMT | Facility: CLINIC | Age: 70
End: 2025-03-19
Payer: MEDICARE

## 2025-03-19 VITALS
SYSTOLIC BLOOD PRESSURE: 130 MMHG | HEIGHT: 70 IN | DIASTOLIC BLOOD PRESSURE: 88 MMHG | HEART RATE: 63 BPM | BODY MASS INDEX: 41.75 KG/M2 | WEIGHT: 291.6 LBS

## 2025-03-19 DIAGNOSIS — E66.813 CLASS 3 SEVERE OBESITY WITH SERIOUS COMORBIDITY AND BODY MASS INDEX (BMI) OF 40.0 TO 44.9 IN ADULT, UNSPECIFIED OBESITY TYPE: Primary | ICD-10-CM

## 2025-03-19 DIAGNOSIS — Z51.81 ENCOUNTER FOR THERAPEUTIC DRUG LEVEL MONITORING: ICD-10-CM

## 2025-03-19 DIAGNOSIS — R73.03 PREDIABETES: ICD-10-CM

## 2025-03-19 DIAGNOSIS — Z79.899 ENCOUNTER FOR LONG-TERM CURRENT USE OF MEDICATION: ICD-10-CM

## 2025-03-19 DIAGNOSIS — R53.82 CHRONIC FATIGUE: ICD-10-CM

## 2025-03-19 DIAGNOSIS — R63.5 WEIGHT GAIN: ICD-10-CM

## 2025-03-19 DIAGNOSIS — E66.01 CLASS 3 SEVERE OBESITY WITH SERIOUS COMORBIDITY AND BODY MASS INDEX (BMI) OF 40.0 TO 44.9 IN ADULT, UNSPECIFIED OBESITY TYPE: Primary | ICD-10-CM

## 2025-03-19 RX ORDER — DIETHYLPROPION HYDROCHLORIDE 75 MG/1
TABLET, EXTENDED RELEASE ORAL
Qty: 45 EACH | Refills: 0 | Status: SHIPPED | OUTPATIENT
Start: 2025-03-19

## 2025-03-19 RX ADMIN — CYANOCOBALAMIN 1000 MCG: 1000 INJECTION, SOLUTION INTRAMUSCULAR; SUBCUTANEOUS at 10:37

## 2025-03-19 NOTE — PROGRESS NOTES
[facility]  [address]  [place] [address]     Office Note      Date: 2025  Patient Name: Marco A Lea Jr.  MRN: [id number]  : 1955  Subjective  Subjective     Chief Complaint  Obesity Management follow-up          Marco A Lea Jr. presents to [facility] for obesity management.   Patient is unsure with weight loss progress. He is discouraged that he has gained so much weight. He has had many hard events since his last visit 5 months ago including the death of his mother, a health scare with his wife, and the passing of his father in law. He has not devoted much time or energy to himself but doesn't feel like he was over-doing it with his diet. Actually he states he doesn't eat much at all. Appetite is well controlled on diethylpropion but he was getting low so he has been cutting it in half for the last 2 weeks. Reports no side effects of prescribed medications today. The patient is taking multivitamin and is taking fish oil.  The patient is not using a food journal.      The patient is exercising with a FITT score of:    Frequency Intensity Time Strength Training   []   0, none []   0 []   0 [x]   0   [x]   1 (1-2x/week) [x]   1 (light) [x]   1 (<10 min) []   1 (1x/week)   []   2 (3-5x/week) []   2 (moderate) []   2 (10-20 min) []   2 (2x/week)   []   3 (daily) []   3 (moderately hard)  []   4 (very hard) []   3 (20-30 min)  []   4 (>30 min) []   3 (3-4x/week)             Review of Systems   Constitutional:  Positive for unexpected weight gain. Negative for appetite change and fatigue.   Eyes:  Negative for visual disturbance.   Cardiovascular:  Negative for chest pain and palpitations.   Gastrointestinal:  Negative for constipation and indigestion.   Neurological:  Negative for light-headedness.   Psychiatric/Behavioral:  Positive for depressed mood and stress.    All other systems reviewed and are  negative.        Current Outpatient Medications:     buPROPion (WELLBUTRIN) 75 MG tablet, bupropion HCl 75 mg tablet, Disp: , Rfl:     CloNIDine (CATAPRES) 0.1 MG tablet, Take 1 tablet by mouth 2 (Two) Times a Day As Needed., Disp: , Rfl:     cyclobenzaprine (FLEXERIL) 10 MG tablet, Take 1 tablet by mouth 2 (Two) Times a Day As Needed., Disp: , Rfl:     Diethylpropion HCl ER 75 MG tablet sustained-release 24 hour, Take one tablet at 11 am., Disp: 45 each, Rfl: 0    fluticasone (FLONASE) 50 MCG/ACT nasal spray, , Disp: , Rfl:     ketorolac (TORADOL) 10 MG tablet, Take 1 tablet by mouth Every 6 (Six) Hours As Needed., Disp: , Rfl:     levocetirizine (XYZAL) 5 MG tablet, levocetirizine 5 mg tablet, Disp: , Rfl:     lidocaine (LIDODERM) 5 %, Place 1 patch on the skin as directed by provider Daily., Disp: , Rfl:     naproxen (EC NAPROSYN) 500 MG EC tablet, Take 1 tablet by mouth 2 (Two) Times a Day With Meals., Disp: , Rfl:     Omega-3 Fatty Acids (fish oil) 1000 MG capsule capsule, Take 1 capsule by mouth 2 (Two) Times a Day With Meals., Disp: , Rfl:     omeprazole (priLOSEC) 40 MG capsule, , Disp: , Rfl:     ondansetron (ZOFRAN) 4 MG tablet, Take 1 tablet by mouth Every 8 (Eight) Hours As Needed for nausea or vomiting., Disp: 20 tablet, Rfl: 0    oxyCODONE-acetaminophen (PERCOCET)  MG per tablet, Take 1 tablet by mouth Every 6 (Six) Hours As Needed., Disp: , Rfl:     propranolol (INDERAL) 40 MG tablet, Take 1 tablet by mouth Daily., Disp: , Rfl:     tiZANidine (ZANAFLEX) 4 MG tablet, Take 1 tablet by mouth Every 8 (Eight) Hours As Needed., Disp: , Rfl:     valsartan-hydrochlorothiazide (DIOVAN-HCT) 320-25 MG per tablet, , Disp: , Rfl:     vitamin B-12 (CYANOCOBALAMIN) 1000 MCG tablet, Take 1 tablet by mouth Daily., Disp: , Rfl:     vitamin D3 125 MCG (5000 UT) capsule capsule, Take 1 capsule by mouth Daily., Disp: , Rfl:     Zinc Sulfate (ZINC 15 PO), Take  by mouth., Disp: , Rfl:     Semaglutide-Weight  "Management 0.25 MG/0.5ML solution auto-injector, Inject 0.5 mL under the skin into the appropriate area as directed 1 (One) Time Per Week., Disp: 2 mL, Rfl: 0    Objective   Start weight: 312 pounds.    Total weight loss: -20.4 pounds/-6.5%  Change in weight since last visit: +16.6lb    Body mass index is 42.44 kg/m².   Body composition analysis completed and showed:   Body Fat %: 53.7    Measurements (in inches)  Waist Circumference: 50      Vital Signs:   /88 (BP Location: Left arm, Patient Position: Sitting)   Pulse 63   Ht 176.5 cm (69.5\")   Wt 132 kg (291 lb 9.6 oz)   BMI 42.44 kg/m²     No LMP for male patient.    Physical Exam   General appears stated age, tearful, and normal appearance   HEENT PERRLA, EOM intact, and conjunctivae normal   Chest/lungs Normal rate, Regular rhythm, and Breathing is unlabored   Extremities without edema   Neuro Good historian and No focal deficit   Skin Warm, dry, intact   Psych normal behavior, normal thought content, and normal concentration     Result Review :                Assessment / Plan        Diagnoses and all orders for this visit:    1. Class 3 severe obesity with serious comorbidity and body mass index (BMI) of 40.0 to 44.9 in adult, unspecified obesity type (Primary)  Assessment & Plan:  Patient's (Body mass index is 42.44 kg/m².) indicates that they are morbidly/severely obese (BMI > 40 or > 35 with obesity - related health condition) with health conditions that include hypertension, impaired fasting glucose, dyslipidemias, GERD, and osteoarthritis . Weight is worsening. BMI  is above average; BMI management plan is completed. We discussed low calorie, low carb based diet program, portion control, increasing exercise, pharmacologic options including diethylpropion, wegovy, and an fritz-based approach such as [employer] Pal or Lose It.     I have instructed the patient to continue with pursuit of medical weight loss as a part of this program. Patient does " meet criteria for use of anorectics at this time as BMI >30  and is not at treatment goal.     The current plan for this month includes:   - Continue to work on lifestyle behavioral changes  - Prioritize protein, fiber, and hydration.   - Do not cut diethlypropion in half, it is less effective.   - AOM is not on formulary. Will do a wegovy sample today to help him get back in control. Denies family or personal history of pancreatitis, MTC, or MEN 2. Discussed common side effects of nausea, diarrhea, vomiting, constipation, stomach pain, headache, fatigue, upset stomach, dizziness, feeling bloated, belching, gas, stomach flu, heartburn.   First injection of Wegovy 0.25mg was administered in the office today LLQ, no complications. Sample pen provided to patient along with content regarding use of the pen, dosing schedule, savings opportunities, and helpful tips.   - Repeat annual labs including UDS.   - Treatment goal 250lb.     Continue sympathomimetic (medication refilled).  This patient 1) has no evidence of serious cardiovascular disease; 2) does not have serious psychiatric disease or a history of substance abuse; 3) has been informed about weight loss medications that are FDA approved for long-term use and told that these have been all documented to be safe and effective whereas phentermine has not; 4) does not demonstrate a clinically significant increase in pulse or blood pressure when taking phentermine; and 5) demonstrates significant weight loss while using the medication.  Patient understands that all antiobesity medications are contraindicated in pregnancy.  Patient denies a history of glaucoma.  Patient understands that long-term use of phentermine is considered off label use of this medication, however, that the endocrine Society and recent research supports that long-term use of phentermine does not appear to have detrimental health effects when used on the appropriate patient.  In addition, a 2019  "study published in an obesity journal on [id number] patient's concluded that \"recommendations to limit phentermine to less than 3 months do not align with current concept of pharmacologic treatment of obesity\", and that \"long-term phentermine users experience greater weight loss without apparent increases in cardiovascular risk\".    We reviewed potential side effects including insomnia, dry mouth, increased heart rate and blood pressure, increased anxiety.  We reviewed reducing caffeine consumption while taking phentermine.  especially if the patient experiences side effects.  The potential risk and benefits of phentermine have been reviewed with the patient, and alternative treatment options were discussed.  All questions were answered, and the patient wishes to move forward with this medication.          Orders:  -     Diethylpropion HCl ER 75 MG tablet sustained-release 24 hour; Take one tablet at 11 am.  Dispense: 45 each; Refill: 0  -     Semaglutide-Weight Management 0.25 MG/0.5ML solution auto-injector; Inject 0.5 mL under the skin into the appropriate area as directed 1 (One) Time Per Week.  Dispense: 2 mL; Refill: 0  -     Hemoglobin A1c  -     Comprehensive Metabolic Panel  -     TSH  -     CBC (No Diff)    2. Prediabetes  Assessment & Plan:  Denies hypoglycemia. Continue low carb diet, regular physical activity, and weight reduction of 10-15%. Check CMP and A1c today.       Orders:  -     Hemoglobin A1c  -     Comprehensive Metabolic Panel  -     TSH    3. Chronic fatigue  -     Comprehensive Metabolic Panel  -     TSH  -     CBC (No Diff)    4. Encounter for therapeutic drug level monitoring  -     Urine Drug Screen - Urine, Clean Catch    5. Encounter for long-term current use of medication  -     Urine Drug Screen - Urine, Clean Catch    6. Weight gain  -     Hemoglobin A1c  -     Comprehensive Metabolic Panel  -     TSH  -     CBC (No Diff)          Follow Up   Return in about 6 weeks (around 4/30/2025) " for Next scheduled follow up.  Patient was given instructions and counseling regarding his condition or for health maintenance advice. Please see specific information pulled into the AVS if appropriate.     Luci Uriostegui, APRN  03/19/2025

## 2025-03-19 NOTE — ASSESSMENT & PLAN NOTE
Denies hypoglycemia. Continue low carb diet, regular physical activity, and weight reduction of 10-15%. Check CMP and A1c today.

## 2025-03-19 NOTE — ASSESSMENT & PLAN NOTE
Patient's (Body mass index is 42.44 kg/m².) indicates that they are morbidly/severely obese (BMI > 40 or > 35 with obesity - related health condition) with health conditions that include hypertension, impaired fasting glucose, dyslipidemias, GERD, and osteoarthritis . Weight is worsening. BMI  is above average; BMI management plan is completed. We discussed low calorie, low carb based diet program, portion control, increasing exercise, pharmacologic options including diethylpropion, wegovy, and an fritz-based approach such as [employer] Pal or Lose It.     I have instructed the patient to continue with pursuit of medical weight loss as a part of this program. Patient does meet criteria for use of anorectics at this time as BMI >30  and is not at treatment goal.     The current plan for this month includes:   - Continue to work on lifestyle behavioral changes  - Prioritize protein, fiber, and hydration.   - Do not cut diethlypropion in half, it is less effective.   - AOM is not on formulary. Will do a wegovy sample today to help him get back in control. Denies family or personal history of pancreatitis, MTC, or MEN 2. Discussed common side effects of nausea, diarrhea, vomiting, constipation, stomach pain, headache, fatigue, upset stomach, dizziness, feeling bloated, belching, gas, stomach flu, heartburn.   First injection of Wegovy 0.25mg was administered in the office today LLQ, no complications. Sample pen provided to patient along with content regarding use of the pen, dosing schedule, savings opportunities, and helpful tips.   - Repeat annual labs including UDS.   - Treatment goal 250lb.     Continue sympathomimetic (medication refilled).  This patient 1) has no evidence of serious cardiovascular disease; 2) does not have serious psychiatric disease or a history of substance abuse; 3) has been informed about weight loss medications that are FDA approved for long-term use and told that these have been all documented  "to be safe and effective whereas phentermine has not; 4) does not demonstrate a clinically significant increase in pulse or blood pressure when taking phentermine; and 5) demonstrates significant weight loss while using the medication.  Patient understands that all antiobesity medications are contraindicated in pregnancy.  Patient denies a history of glaucoma.  Patient understands that long-term use of phentermine is considered off label use of this medication, however, that the endocrine Society and recent research supports that long-term use of phentermine does not appear to have detrimental health effects when used on the appropriate patient.  In addition, a 2019 study published in an obesity journal on [id number] patient's concluded that \"recommendations to limit phentermine to less than 3 months do not align with current concept of pharmacologic treatment of obesity\", and that \"long-term phentermine users experience greater weight loss without apparent increases in cardiovascular risk\".    We reviewed potential side effects including insomnia, dry mouth, increased heart rate and blood pressure, increased anxiety.  We reviewed reducing caffeine consumption while taking phentermine.  especially if the patient experiences side effects.  The potential risk and benefits of phentermine have been reviewed with the patient, and alternative treatment options were discussed.  All questions were answered, and the patient wishes to move forward with this medication.        "

## 2025-03-20 LAB
ALBUMIN SERPL-MCNC: 4.4 G/DL (ref 3.5–5.2)
ALBUMIN/GLOB SERPL: 1.6 G/DL
ALP SERPL-CCNC: 109 U/L (ref 39–117)
ALT SERPL-CCNC: 45 U/L (ref 1–41)
AMPHETAMINES UR QL SCN: NEGATIVE NG/ML
AST SERPL-CCNC: 39 U/L (ref 1–40)
BARBITURATES UR QL SCN: NEGATIVE NG/ML
BENZODIAZ UR QL SCN: NEGATIVE NG/ML
BILIRUB SERPL-MCNC: 0.8 MG/DL (ref 0–1.2)
BUN SERPL-MCNC: 29 MG/DL (ref 8–23)
BUN/CREAT SERPL: 27.6 (ref 7–25)
BZE UR QL SCN: NEGATIVE NG/ML
CALCIUM SERPL-MCNC: 9.4 MG/DL (ref 8.6–10.5)
CANNABINOIDS UR QL SCN: NEGATIVE NG/ML
CHLORIDE SERPL-SCNC: 100 MMOL/L (ref 98–107)
CO2 SERPL-SCNC: 25.8 MMOL/L (ref 22–29)
CREAT SERPL-MCNC: 1.05 MG/DL (ref 0.76–1.27)
CREAT UR-MCNC: 141.2 MG/DL (ref 20–300)
EGFRCR SERPLBLD CKD-EPI 2021: 76.4 ML/MIN/1.73
ERYTHROCYTE [DISTWIDTH] IN BLOOD BY AUTOMATED COUNT: 13 % (ref 12.3–15.4)
GLOBULIN SER CALC-MCNC: 2.8 GM/DL
GLUCOSE SERPL-MCNC: 95 MG/DL (ref 65–99)
HBA1C MFR BLD: 5.9 % (ref 4.8–5.6)
HCT VFR BLD AUTO: 45.4 % (ref 37.5–51)
HGB BLD-MCNC: 15 G/DL (ref 13–17.7)
LABORATORY COMMENT REPORT: ABNORMAL
MCH RBC QN AUTO: 28.2 PG (ref 26.6–33)
MCHC RBC AUTO-ENTMCNC: 33 G/DL (ref 31.5–35.7)
MCV RBC AUTO: 85.5 FL (ref 79–97)
METHADONE UR QL SCN: NEGATIVE NG/ML
OPIATES UR QL SCN: POSITIVE NG/ML
OXYCODONE+OXYMORPHONE UR QL SCN: POSITIVE NG/ML
PCP UR QL: NEGATIVE NG/ML
PH UR: 6 [PH] (ref 4.5–8.9)
PLATELET # BLD AUTO: 274 10*3/MM3 (ref 140–450)
POTASSIUM SERPL-SCNC: 4.4 MMOL/L (ref 3.5–5.2)
PROPOXYPH UR QL SCN: NEGATIVE NG/ML
PROT SERPL-MCNC: 7.2 G/DL (ref 6–8.5)
RBC # BLD AUTO: 5.31 10*6/MM3 (ref 4.14–5.8)
SODIUM SERPL-SCNC: 139 MMOL/L (ref 136–145)
TSH SERPL DL<=0.005 MIU/L-ACNC: 1.26 UIU/ML (ref 0.27–4.2)
WBC # BLD AUTO: 7.46 10*3/MM3 (ref 3.4–10.8)

## 2025-03-20 RX ORDER — CYANOCOBALAMIN 1000 UG/ML
1000 INJECTION, SOLUTION INTRAMUSCULAR; SUBCUTANEOUS
Status: SHIPPED | OUTPATIENT
Start: 2025-03-20

## 2025-04-09 ENCOUNTER — PATIENT MESSAGE (OUTPATIENT)
Dept: BARIATRICS/WEIGHT MGMT | Facility: CLINIC | Age: 70
End: 2025-04-09
Payer: MEDICARE

## 2025-04-29 ENCOUNTER — OFFICE VISIT (OUTPATIENT)
Dept: BARIATRICS/WEIGHT MGMT | Facility: CLINIC | Age: 70
End: 2025-04-29
Payer: MEDICARE

## 2025-04-29 VITALS
HEIGHT: 70 IN | HEART RATE: 78 BPM | DIASTOLIC BLOOD PRESSURE: 76 MMHG | WEIGHT: 285 LBS | BODY MASS INDEX: 40.8 KG/M2 | SYSTOLIC BLOOD PRESSURE: 124 MMHG

## 2025-04-29 DIAGNOSIS — E66.813 CLASS 3 SEVERE OBESITY WITH SERIOUS COMORBIDITY AND BODY MASS INDEX (BMI) OF 40.0 TO 44.9 IN ADULT, UNSPECIFIED OBESITY TYPE: Primary | ICD-10-CM

## 2025-04-29 DIAGNOSIS — E66.01 CLASS 3 SEVERE OBESITY WITH SERIOUS COMORBIDITY AND BODY MASS INDEX (BMI) OF 40.0 TO 44.9 IN ADULT, UNSPECIFIED OBESITY TYPE: Primary | ICD-10-CM

## 2025-04-29 DIAGNOSIS — I10 HYPERTENSION, UNSPECIFIED TYPE: ICD-10-CM

## 2025-04-29 PROCEDURE — 1159F MED LIST DOCD IN RCRD: CPT | Performed by: NURSE PRACTITIONER

## 2025-04-29 PROCEDURE — 3074F SYST BP LT 130 MM HG: CPT | Performed by: NURSE PRACTITIONER

## 2025-04-29 PROCEDURE — 99214 OFFICE O/P EST MOD 30 MIN: CPT | Performed by: NURSE PRACTITIONER

## 2025-04-29 PROCEDURE — 3078F DIAST BP <80 MM HG: CPT | Performed by: NURSE PRACTITIONER

## 2025-04-29 PROCEDURE — 1160F RVW MEDS BY RX/DR IN RCRD: CPT | Performed by: NURSE PRACTITIONER

## 2025-04-29 RX ORDER — DIETHYLPROPION HYDROCHLORIDE 75 MG/1
TABLET, EXTENDED RELEASE ORAL
Qty: 90 EACH | Refills: 0 | Status: SHIPPED | OUTPATIENT
Start: 2025-04-29

## 2025-04-29 NOTE — PROGRESS NOTES
[facility]  [address]  [place] [address]     Office Note      Date: 2025  Patient Name: Marco A Lea Jr.  MRN: [id number]  : 1955  Subjective  Subjective     Chief Complaint  Obesity Management follow-up          Marco A Lea Jr. presents to [facility] for obesity management.   Patient is unsure with weight loss progress. He was down a total of 10lb before vacation then gained 2-3 lb. Appetite is moderately controlled. The wegovy was tremendously helpful. Reports no side effects of prescribed medications today. The patient is taking multivitamin and is taking fish oil. The patient is not using a food journal but he is being mindful of his food choices.     He is just returning from [place], ate a lot of fresh seafood. He needs to increase his protein intake, discussed ways to increase his protein intake. He states that he normally eats more protein than he has been.       24 hour recall:  Breakfast: 3 boiled eggs  Lunch: 1 piece chicken  Dinner: 3 ribs, small portion mashed potatoes  Snack: none  Water Intake: 80 oz  Other beverages: coffee w/ hazelnut creamer, sugar   Alcohol: none    The patient is exercising walking with a FITT score of:    Frequency Intensity Time Strength Training   []   0, none []   0 []   0 [x]   0   [x]   1 (1-2x/week) [x]   1 (light) []   1 (<10 min) []   1 (1x/week)   []   2 (3-5x/week) []   2 (moderate) [x]   2 (10-20 min) []   2 (2x/week)   []   3 (daily) []   3 (moderately hard)  []   4 (very hard) []   3 (20-30 min)  []   4 (>30 min) []   3 (3-4x/week)               Review of Systems   Constitutional:  Negative for appetite change and fatigue.   Eyes:  Negative for visual disturbance.   Cardiovascular:  Negative for chest pain and palpitations.   Gastrointestinal:  Negative for constipation and indigestion.   Neurological:  Negative for light-headedness.         Current Outpatient  Medications:     buPROPion (WELLBUTRIN) 75 MG tablet, bupropion HCl 75 mg tablet, Disp: , Rfl:     CloNIDine (CATAPRES) 0.1 MG tablet, Take 1 tablet by mouth 2 (Two) Times a Day As Needed., Disp: , Rfl:     cyclobenzaprine (FLEXERIL) 10 MG tablet, Take 1 tablet by mouth 2 (Two) Times a Day As Needed., Disp: , Rfl:     Diethylpropion HCl ER 75 MG tablet sustained-release 24 hour, Take one tablet at 11 am., Disp: 90 each, Rfl: 0    fluticasone (FLONASE) 50 MCG/ACT nasal spray, , Disp: , Rfl:     ketorolac (TORADOL) 10 MG tablet, Take 1 tablet by mouth Every 6 (Six) Hours As Needed., Disp: , Rfl:     levocetirizine (XYZAL) 5 MG tablet, levocetirizine 5 mg tablet, Disp: , Rfl:     lidocaine (LIDODERM) 5 %, Place 1 patch on the skin as directed by provider Daily., Disp: , Rfl:     naproxen (EC NAPROSYN) 500 MG EC tablet, Take 1 tablet by mouth 2 (Two) Times a Day With Meals., Disp: , Rfl:     Omega-3 Fatty Acids (fish oil) 1000 MG capsule capsule, Take 1 capsule by mouth 2 (Two) Times a Day With Meals., Disp: , Rfl:     omeprazole (priLOSEC) 40 MG capsule, , Disp: , Rfl:     ondansetron (ZOFRAN) 4 MG tablet, Take 1 tablet by mouth Every 8 (Eight) Hours As Needed for nausea or vomiting., Disp: 20 tablet, Rfl: 0    oxyCODONE-acetaminophen (PERCOCET)  MG per tablet, Take 1 tablet by mouth Every 6 (Six) Hours As Needed., Disp: , Rfl:     propranolol (INDERAL) 40 MG tablet, Take 1 tablet by mouth Daily., Disp: , Rfl:     Semaglutide-Weight Management 0.25 MG/0.5ML solution auto-injector, Inject 0.5 mL under the skin into the appropriate area as directed 1 (One) Time Per Week., Disp: 2 mL, Rfl: 0    valsartan-hydrochlorothiazide (DIOVAN-HCT) 320-25 MG per tablet, , Disp: , Rfl:     vitamin B-12 (CYANOCOBALAMIN) 1000 MCG tablet, Take 1 tablet by mouth Daily., Disp: , Rfl:     vitamin D3 125 MCG (5000 UT) capsule capsule, Take 1 capsule by mouth Daily., Disp: , Rfl:     Zinc Sulfate (ZINC 15 PO), Take  by mouth., Disp: ,  "Rfl:     tiZANidine (ZANAFLEX) 4 MG tablet, Take 1 tablet by mouth Every 8 (Eight) Hours As Needed. (Patient not taking: Reported on 4/29/2025), Disp: , Rfl:     Current Facility-Administered Medications:     cyanocobalamin injection 1,000 mcg, 1,000 mcg, Intramuscular, Q28 Days, Luic Uriostegui APRN, 1,000 mcg at 03/19/25 1037    Objective   Start weight: 312 pounds.    Total weight loss: -27 pounds/-8.65%  Change in weight since last visit: -6.9lb    Body mass index is 41.48 kg/m².   Body composition analysis completed and showed:   Body Fat %: 46.0    Measurements (in inches)  Waist Circumference: 54      Vital Signs:   /76 (BP Location: Left arm, Patient Position: Sitting)   Pulse 78   Ht 176.5 cm (69.5\")   Wt 129 kg (285 lb)   BMI 41.48 kg/m²     No LMP for male patient.    Physical Exam   General appears stated age and normal appearance   HEENT PERRLA, EOM intact, and conjunctivae normal   Chest/lungs Normal rate, Regular rhythm, and Breathing is unlabored   Extremities without edema   Neuro Good historian and No focal deficit   Skin Warm, dry, intact   Psych normal behavior, normal thought content, and normal concentration     Result Review :   The following data was reviewed by: WINIFRED Lechuga on 04/29/2025:  No visits with results within 1 Month(s) from this visit.   Latest known visit with results is:   Office Visit on 03/19/2025   Component Date Value Ref Range Status    Amphetamine, Urine Qual 03/19/2025 Negative  Zevcpe=7386 ng/mL Final    Barbiturates Screen, Urine 03/19/2025 Negative  Gjagee=242 ng/mL Final    Benzodiazepine Screen, Urine 03/19/2025 Negative  Mkmism=172 ng/mL Final    THC Screen, Urine 03/19/2025 Negative  Cutoff=20 ng/mL Final    Cocaine Screen, Urine 03/19/2025 Negative  Jdwnif=902 ng/mL Final    Opiate Screen, Urine 03/19/2025 Positive (A)  Fnbnus=526 ng/mL Final    Opiate test includes Codeine, Morphine, Hydromorphone, Hydrocodone.    Oxycodone/Oxymorphone, " Urine 03/19/2025 Positive (A)  Xxffij=233 ng/mL Final    Test includes Oxycodone and Oxymorphone    Phencyclidine (PCP), Urine 03/19/2025 Negative  Cutoff=25 ng/mL Final    Methadone Screen, Urine 03/19/2025 Negative  Bcyibs=866 ng/mL Final    Propoxyphene Screen 03/19/2025 Negative  Navtez=138 ng/mL Final    Creatinine, Urine 03/19/2025 141.2  20.0 - 300.0 mg/dL Final    pH, UA 03/19/2025 6.0  4.5 - 8.9 Final    Please note 03/19/2025 Comment   Final    Comment: This assay provides a preliminary unconfirmed analytical test  result that may be suitable for clinical management of patients  in certain situations. Drug-test results should be interpreted  in the context of clinical information. Patient metabolic  variables, specific drug chemistry, and specimen  characteristics can affect test outcome. Technical consultation  is available if a test result is inconsistent with an expected  outcome.    Email:  [email or web address]    Phone:  [phone]      Hemoglobin A1C 03/19/2025 5.90 (H)  4.80 - 5.60 % Final    Comment: Hemoglobin A1C Ranges:  Increased Risk for Diabetes  5.7% to 6.4%  Diabetes                     >= 6.5%  Diabetic Goal                < 7.0%      Glucose 03/19/2025 95  65 - 99 mg/dL Final    BUN 03/19/2025 29 (H)  8 - 23 mg/dL Final    Creatinine 03/19/2025 1.05  0.76 - 1.27 mg/dL Final    EGFR Result 03/19/2025 76.4  >60.0 mL/min/1.73 Final    Comment: GFR Categories in Chronic Kidney Disease (CKD)/X09/  /X09/  GFR Category          GFR (mL/min/1.73)    Interpretation  G1/X09/                    90 or greater/X09/        Normal or high  (1)  G2//                    60-89                Mild decrease  (1)  G3a                   45-59                Mild to moderate  decrease  G3b                   30-44                Moderate to  severe decrease  G4                    15-29                Severe decrease  G5                    14 or less           Kidney  failure//  /[id number]/  (1)In the absence of evidence of kidney disease, neither  GFR category G1 or G2 fulfill the criteria for CKD.  eGFR calculation 2021 CKD-EPI creatinine equation, which  does not include race as a factor      BUN/Creatinine Ratio 03/19/2025 27.6 (H)  7.0 - 25.0 Final    Sodium 03/19/2025 139  136 - 145 mmol/L Final    Potassium 03/19/2025 4.4  3.5 - 5.2 mmol/L Final    Chloride 03/19/2025 100  98 - 107 mmol/L Final    Total CO2 03/19/2025 25.8  22.0 - 29.0 mmol/L Final    Calcium 03/19/2025 9.4  8.6 - 10.5 mg/dL Final    Total Protein 03/19/2025 7.2  6.0 - 8.5 g/dL Final    Albumin 03/19/2025 4.4  3.5 - 5.2 g/dL Final    Globulin 03/19/2025 2.8  gm/dL Final    A/G Ratio 03/19/2025 1.6  g/dL Final    Total Bilirubin 03/19/2025 0.8  0.0 - 1.2 mg/dL Final    Alkaline Phosphatase 03/19/2025 109  39 - 117 U/L Final    AST (SGOT) 03/19/2025 39  1 - 40 U/L Final    ALT (SGPT) 03/19/2025 45 (H)  1 - 41 U/L Final    TSH 03/19/2025 1.260  0.270 - 4.200 uIU/mL Final    WBC 03/19/2025 7.46  3.40 - 10.80 10*3/mm3 Final    RBC 03/19/2025 5.31  4.14 - 5.80 10*6/mm3 Final    Hemoglobin 03/19/2025 15.0  13.0 - 17.7 g/dL Final    Hematocrit 03/19/2025 45.4  37.5 - 51.0 % Final    MCV 03/19/2025 85.5  79.0 - 97.0 fL Final    MCH 03/19/2025 28.2  26.6 - 33.0 pg Final    MCHC 03/19/2025 33.0  31.5 - 35.7 g/dL Final    RDW 03/19/2025 13.0  12.3 - 15.4 % Final    Platelets 03/19/2025 274  140 - 450 10*3/mm3 Final                  Assessment / Plan        Diagnoses and all orders for this visit:    1. Class 3 severe obesity with serious comorbidity and body mass index (BMI) of 40.0 to 44.9 in adult, unspecified obesity type (Primary)  Assessment & Plan:  Patient's (Body mass index is 41.48 kg/m².) indicates that they are morbidly/severely obese (BMI > 40 or > 35 with obesity - related health condition) with health conditions that include hypertension, impaired fasting glucose, dyslipidemias, GERD, and  "osteoarthritis . Weight is improving with treatment. BMI  is above average; BMI management plan is completed. We discussed low calorie, low carb based diet program, portion control, increasing exercise, pharmacologic options including diethylpropion, wegovy, and an fritz-based approach such as [employer] Pal or Lose It.     I have instructed the patient to continue with pursuit of medical weight loss as a part of this program. Patient does meet criteria for use of anorectics at this time as BMI >30  and is not at treatment goal.     The current plan for this month includes:   - Continue current exercise efforts  - Continue to prioritize protein, fiber, and hydration.   - OK to continue wegovy with sample, I can provide a total of 3 month supply only. This is month 2.   - Treatment goal 250lb.     Continue sympathomimetic (medication refilled).  This patient 1) has no evidence of serious cardiovascular disease; 2) does not have serious psychiatric disease or a history of substance abuse; 3) has been informed about weight loss medications that are FDA approved for long-term use and told that these have been all documented to be safe and effective whereas phentermine has not; 4) does not demonstrate a clinically significant increase in pulse or blood pressure when taking phentermine; and 5) demonstrates significant weight loss while using the medication.  Patient understands that all antiobesity medications are contraindicated in pregnancy.  Patient denies a history of glaucoma.  Patient understands that long-term use of phentermine is considered off label use of this medication, however, that the endocrine Society and recent research supports that long-term use of phentermine does not appear to have detrimental health effects when used on the appropriate patient.  In addition, a 2019 study published in an obesity journal on 13,747 patient's concluded that \"recommendations to limit phentermine to less than 3 months do not " "align with current concept of pharmacologic treatment of obesity\", and that \"long-term phentermine users experience greater weight loss without apparent increases in cardiovascular risk\".    We reviewed potential side effects including insomnia, dry mouth, increased heart rate and blood pressure, increased anxiety.  We reviewed reducing caffeine consumption while taking phentermine.  especially if the patient experiences side effects.  The potential risk and benefits of phentermine have been reviewed with the patient, and alternative treatment options were discussed.  All questions were answered, and the patient wishes to move forward with this medication.        Orders:  -     Semaglutide-Weight Management 0.25 MG/0.5ML solution auto-injector; Inject 0.5 mL under the skin into the appropriate area as directed 1 (One) Time Per Week.  Dispense: 2 mL; Refill: 0  -     Diethylpropion HCl ER 75 MG tablet sustained-release 24 hour; Take one tablet at 11 am.  Dispense: 90 each; Refill: 0    2. Hypertension, unspecified type  Assessment & Plan:  Hypertension is stable and controlled  Continue current treatment regimen.  Weight loss.  Regular aerobic exercise.  Ambulatory blood pressure monitoring.  Blood pressure will be reassessed in 1 month.            Follow Up   Return in about 1 month (around 5/29/2025) for Next scheduled follow up.  Patient was given instructions and counseling regarding his condition or for health maintenance advice. Please see specific information pulled into the AVS if appropriate.     Luci Uriostegui, WINIFRED  04/29/2025   "

## 2025-04-29 NOTE — ASSESSMENT & PLAN NOTE
Patient's (Body mass index is 41.48 kg/m².) indicates that they are morbidly/severely obese (BMI > 40 or > 35 with obesity - related health condition) with health conditions that include hypertension, impaired fasting glucose, dyslipidemias, GERD, and osteoarthritis . Weight is improving with treatment. BMI  is above average; BMI management plan is completed. We discussed low calorie, low carb based diet program, portion control, increasing exercise, pharmacologic options including diethylpropion, wegovy, and an fritz-based approach such as [employer] Pal or Lose It.     I have instructed the patient to continue with pursuit of medical weight loss as a part of this program. Patient does meet criteria for use of anorectics at this time as BMI >30  and is not at treatment goal.     The current plan for this month includes:   - Continue current exercise efforts  - Continue to prioritize protein, fiber, and hydration.   - OK to continue wegovy with sample, I can provide a total of 3 month supply only. This is month 2.   - Treatment goal 250lb.     Continue sympathomimetic (medication refilled).  This patient 1) has no evidence of serious cardiovascular disease; 2) does not have serious psychiatric disease or a history of substance abuse; 3) has been informed about weight loss medications that are FDA approved for long-term use and told that these have been all documented to be safe and effective whereas phentermine has not; 4) does not demonstrate a clinically significant increase in pulse or blood pressure when taking phentermine; and 5) demonstrates significant weight loss while using the medication.  Patient understands that all antiobesity medications are contraindicated in pregnancy.  Patient denies a history of glaucoma.  Patient understands that long-term use of phentermine is considered off label use of this medication, however, that the endocrine Society and recent research supports that long-term use of  "phentermine does not appear to have detrimental health effects when used on the appropriate patient.  In addition, a 2019 study published in an obesity journal on [id number] patient's concluded that \"recommendations to limit phentermine to less than 3 months do not align with current concept of pharmacologic treatment of obesity\", and that \"long-term phentermine users experience greater weight loss without apparent increases in cardiovascular risk\".    We reviewed potential side effects including insomnia, dry mouth, increased heart rate and blood pressure, increased anxiety.  We reviewed reducing caffeine consumption while taking phentermine.  especially if the patient experiences side effects.  The potential risk and benefits of phentermine have been reviewed with the patient, and alternative treatment options were discussed.  All questions were answered, and the patient wishes to move forward with this medication.      "

## 2025-05-27 ENCOUNTER — OFFICE VISIT (OUTPATIENT)
Dept: BARIATRICS/WEIGHT MGMT | Facility: CLINIC | Age: 70
End: 2025-05-27
Payer: COMMERCIAL

## 2025-05-27 VITALS
HEIGHT: 70 IN | WEIGHT: 269.8 LBS | BODY MASS INDEX: 38.63 KG/M2 | DIASTOLIC BLOOD PRESSURE: 78 MMHG | HEART RATE: 66 BPM | SYSTOLIC BLOOD PRESSURE: 124 MMHG

## 2025-05-27 DIAGNOSIS — E66.01 CLASS 2 SEVERE OBESITY WITH SERIOUS COMORBIDITY AND BODY MASS INDEX (BMI) OF 39.0 TO 39.9 IN ADULT, UNSPECIFIED OBESITY TYPE: Primary | ICD-10-CM

## 2025-05-27 DIAGNOSIS — E66.812 CLASS 2 SEVERE OBESITY WITH SERIOUS COMORBIDITY AND BODY MASS INDEX (BMI) OF 39.0 TO 39.9 IN ADULT, UNSPECIFIED OBESITY TYPE: Primary | ICD-10-CM

## 2025-05-27 DIAGNOSIS — R73.09 ABNORMAL GLUCOSE: ICD-10-CM

## 2025-05-27 PROCEDURE — 99214 OFFICE O/P EST MOD 30 MIN: CPT | Performed by: NURSE PRACTITIONER

## 2025-05-27 RX ORDER — DIETHYLPROPION HYDROCHLORIDE 75 MG/1
TABLET, EXTENDED RELEASE ORAL
Qty: 60 EACH | Refills: 0 | Status: SHIPPED | OUTPATIENT
Start: 2025-05-27

## 2025-05-27 NOTE — ASSESSMENT & PLAN NOTE
Denies hypoglycemia. Continue low carb diet, regular physical activity, and weight reduction of 10-15%. Con't wegovy.

## 2025-05-27 NOTE — ASSESSMENT & PLAN NOTE
Patient's (Body mass index is 39.27 kg/m².) indicates that they are morbidly/severely obese (BMI > 40 or > 35 with obesity - related health condition) with health conditions that include hypertension, impaired fasting glucose, dyslipidemias, GERD, and osteoarthritis . Weight is improving with treatment. BMI  is above average; BMI management plan is completed. We discussed low calorie, low carb based diet program, portion control, increasing exercise, management of depression/anxiety/stress to control compensatory eating, pharmacologic options including wegovy, diethylpropion, and an fritz-based approach such as [employer] Pal or Lose It.     I have instructed the patient to continue with pursuit of medical weight loss as a part of this program. Patient does meet criteria for use of anorectics at this time as BMI >30  and is not at treatment goal.     The current plan for this month includes:   - Continue current exercise efforts  - Continue to prioritize protein, fiber, and hydration.   - Move wegovy to every other week, this is the last sample I can provide due to hospital policy. We will decide next visit if he wants to continue this by paying cash through [facility].   - Treatment goal 250lb.     Continue sympathomimetic (medication refilled).  This patient 1) has no evidence of serious cardiovascular disease; 2) does not have serious psychiatric disease or a history of substance abuse; 3) has been informed about weight loss medications that are FDA approved for long-term use and told that these have been all documented to be safe and effective whereas phentermine has not; 4) does not demonstrate a clinically significant increase in pulse or blood pressure when taking phentermine; and 5) demonstrates significant weight loss while using the medication.  Patient understands that all antiobesity medications are contraindicated in pregnancy.  Patient denies a history of glaucoma.  Patient understands that long-term use  "of phentermine is considered off label use of this medication, however, that the endocrine Society and recent research supports that long-term use of phentermine does not appear to have detrimental health effects when used on the appropriate patient.  In addition, a 2019 study published in an obesity journal on [id number] patient's concluded that \"recommendations to limit phentermine to less than 3 months do not align with current concept of pharmacologic treatment of obesity\", and that \"long-term phentermine users experience greater weight loss without apparent increases in cardiovascular risk\".    We reviewed potential side effects including insomnia, dry mouth, increased heart rate and blood pressure, increased anxiety.  We reviewed reducing caffeine consumption while taking phentermine.  especially if the patient experiences side effects.  The potential risk and benefits of phentermine have been reviewed with the patient, and alternative treatment options were discussed.  All questions were answered, and the patient wishes to move forward with this medication.    "

## 2025-05-27 NOTE — PROGRESS NOTES
[facility]  [address]  [place] [address]     Office Note      Date: 2025  Patient Name: Marco A Lea Jr.  MRN: [id number]  : 1955  Subjective  Subjective     Chief Complaint  Obesity Management follow-up          Marco A Lea Jr. presents to [facility] for obesity management.   Patient is satisfied with weight loss progress. Appetite is well controlled. Doesn't think about food on wegovy, it has been very helpful. Reports no side effects of prescribed medications today. The patient is taking multivitamin and is taking fish oil.  The patient is using a food journal, [employer].    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 Has intentionally been more active since last visit. with a FITT score of:    Frequency Intensity Time Strength Training   []   0, none []   0 []   0 []   0   [x]   1 (1-2x/week) [x]   1 (light) []   1 (<10 min) [x]   1 (1x/week)   []   2 (3-5x/week) []   2 (moderate) [x]   2 (10-20 min) []   2 (2x/week)   []   3 (daily) []   3 (moderately hard)  []   4 (very hard) []   3 (20-30 min)  []   4 (>30 min) []   3 (3-4x/week)               Review of Systems   Constitutional:  Negative for appetite change and fatigue.   Eyes:  Negative for visual disturbance.   Cardiovascular:  Negative for chest pain and palpitations.   Gastrointestinal:  Negative for constipation and indigestion.   Neurological:  Negative for light-headedness.         Current Outpatient Medications:     buPROPion (WELLBUTRIN) 75 MG tablet, bupropion HCl 75 mg tablet, Disp: , Rfl:     CloNIDine (CATAPRES) 0.1 MG tablet, Take 1 tablet by mouth 2 (Two) Times a Day As Needed., Disp: , Rfl:     cyclobenzaprine (FLEXERIL) 10 MG tablet, Take 1 tablet by mouth 2 (Two) Times a Day As Needed.,  Disp: , Rfl:     Diethylpropion HCl ER 75 MG tablet sustained-release 24 hour, Take one tablet at 11 am., Disp: 60 each, Rfl: 0    fluticasone (FLONASE) 50 MCG/ACT nasal spray, , Disp: , Rfl:     ketorolac (TORADOL) 10 MG tablet, Take 1 tablet by mouth Every 6 (Six) Hours As Needed., Disp: , Rfl:     levocetirizine (XYZAL) 5 MG tablet, levocetirizine 5 mg tablet, Disp: , Rfl:     lidocaine (LIDODERM) 5 %, Place 1 patch on the skin as directed by provider Daily., Disp: , Rfl:     naproxen (EC NAPROSYN) 500 MG EC tablet, Take 1 tablet by mouth 2 (Two) Times a Day With Meals., Disp: , Rfl:     Omega-3 Fatty Acids (fish oil) 1000 MG capsule capsule, Take 1 capsule by mouth 2 (Two) Times a Day With Meals., Disp: , Rfl:     omeprazole (priLOSEC) 40 MG capsule, , Disp: , Rfl:     ondansetron (ZOFRAN) 4 MG tablet, Take 1 tablet by mouth Every 8 (Eight) Hours As Needed for nausea or vomiting., Disp: 20 tablet, Rfl: 0    oxyCODONE-acetaminophen (PERCOCET)  MG per tablet, Take 1 tablet by mouth Every 6 (Six) Hours As Needed., Disp: , Rfl:     propranolol (INDERAL) 40 MG tablet, Take 1 tablet by mouth Daily., Disp: , Rfl:     Semaglutide-Weight Management 0.25 MG/0.5ML solution auto-injector, Inject 0.5 mL under the skin into the appropriate area as directed 1 (One) Time Per Week., Disp: 2 mL, Rfl: 0    tiZANidine (ZANAFLEX) 4 MG tablet, Take 1 tablet by mouth Every 8 (Eight) Hours As Needed., Disp: , Rfl:     valsartan-hydrochlorothiazide (DIOVAN-HCT) 320-25 MG per tablet, , Disp: , Rfl:     vitamin B-12 (CYANOCOBALAMIN) 1000 MCG tablet, Take 1 tablet by mouth Daily., Disp: , Rfl:     vitamin D3 125 MCG (5000 UT) capsule capsule, Take 1 capsule by mouth Daily., Disp: , Rfl:     Zinc Sulfate (ZINC 15 PO), Take  by mouth., Disp: , Rfl:     Current Facility-Administered Medications:     cyanocobalamin injection 1,000 mcg, 1,000 mcg, Intramuscular, Q28 Days, Luci Uriostegui APRN, 1,000 mcg at 03/19/25 1037    Objective  "  Start weight: 312 pounds.    Total weight loss: -42.2 pounds/-13.52%  Change in weight since last visit: -15.2lb    Body mass index is 39.27 kg/m².   Body composition analysis completed and showed:   Body Fat %: 54.0    Measurements (in inches)  Waist Circumference: 51      Vital Signs:   /78 (BP Location: Left arm, Patient Position: Sitting)   Pulse 66   Ht 176.5 cm (69.5\")   Wt 122 kg (269 lb 12.8 oz)   BMI 39.27 kg/m²     No LMP for male patient.    Physical Exam   General appears stated age and normal appearance   HEENT PERRLA, EOM intact, and conjunctivae normal   Chest/lungs Normal rate, Regular rhythm, and Breathing is unlabored   Extremities without edema   Neuro Good historian and No focal deficit   Skin Warm, dry, intact   Psych normal behavior, normal thought content, and normal concentration     Result Review :                Assessment / Plan        Diagnoses and all orders for this visit:    1. Class 2 severe obesity with serious comorbidity and body mass index (BMI) of 39.0 to 39.9 in adult, unspecified obesity type (Primary)  Assessment & Plan:  Patient's (Body mass index is 39.27 kg/m².) indicates that they are morbidly/severely obese (BMI > 40 or > 35 with obesity - related health condition) with health conditions that include hypertension, impaired fasting glucose, dyslipidemias, GERD, and osteoarthritis . Weight is improving with treatment. BMI  is above average; BMI management plan is completed. We discussed low calorie, low carb based diet program, portion control, increasing exercise, management of depression/anxiety/stress to control compensatory eating, pharmacologic options including wegovy, diethylpropion, and an fritz-based approach such as Xeros Pal or Lose It.     I have instructed the patient to continue with pursuit of medical weight loss as a part of this program. Patient does meet criteria for use of anorectics at this time as BMI >30  and is not at treatment goal. " "    The current plan for this month includes:   - Continue current exercise efforts  - Continue to prioritize protein, fiber, and hydration.   - Move wegovy to every other week, this is the last sample I can provide due to hospital policy. We will decide next visit if he wants to continue this by paying cash through [facility].   - Treatment goal 250lb.     Continue sympathomimetic (medication refilled).  This patient 1) has no evidence of serious cardiovascular disease; 2) does not have serious psychiatric disease or a history of substance abuse; 3) has been informed about weight loss medications that are FDA approved for long-term use and told that these have been all documented to be safe and effective whereas phentermine has not; 4) does not demonstrate a clinically significant increase in pulse or blood pressure when taking phentermine; and 5) demonstrates significant weight loss while using the medication.  Patient understands that all antiobesity medications are contraindicated in pregnancy.  Patient denies a history of glaucoma.  Patient understands that long-term use of phentermine is considered off label use of this medication, however, that the endocrine Society and recent research supports that long-term use of phentermine does not appear to have detrimental health effects when used on the appropriate patient.  In addition, a 2019 study published in an obesity journal on 13,972 patient's concluded that \"recommendations to limit phentermine to less than 3 months do not align with current concept of pharmacologic treatment of obesity\", and that \"long-term phentermine users experience greater weight loss without apparent increases in cardiovascular risk\".    We reviewed potential side effects including insomnia, dry mouth, increased heart rate and blood pressure, increased anxiety.  We reviewed reducing caffeine consumption while taking phentermine.  especially if the patient experiences side effects.  The " potential risk and benefits of phentermine have been reviewed with the patient, and alternative treatment options were discussed.  All questions were answered, and the patient wishes to move forward with this medication.      Orders:  -     Diethylpropion HCl ER 75 MG tablet sustained-release 24 hour; Take one tablet at 11 am.  Dispense: 60 each; Refill: 0  -     Semaglutide-Weight Management 0.25 MG/0.5ML solution auto-injector; Inject 0.5 mL under the skin into the appropriate area as directed 1 (One) Time Per Week.  Dispense: 2 mL; Refill: 0    2. Abnormal glucose  Assessment & Plan:  Denies hypoglycemia. Continue low carb diet, regular physical activity, and weight reduction of 10-15%. Con't wegovy.              Follow Up   Return in about 1 month (around 6/27/2025) for Next scheduled follow up.  Patient was given instructions and counseling regarding his condition or for health maintenance advice. Please see specific information pulled into the AVS if appropriate.     Luci Uriostegui, APRN  05/27/2025

## 2025-07-28 ENCOUNTER — OFFICE VISIT (OUTPATIENT)
Dept: BARIATRICS/WEIGHT MGMT | Facility: CLINIC | Age: 70
End: 2025-07-28
Payer: COMMERCIAL

## 2025-07-28 VITALS
DIASTOLIC BLOOD PRESSURE: 76 MMHG | HEART RATE: 60 BPM | BODY MASS INDEX: 37.22 KG/M2 | WEIGHT: 260 LBS | SYSTOLIC BLOOD PRESSURE: 130 MMHG | HEIGHT: 70 IN

## 2025-07-28 DIAGNOSIS — R73.03 PREDIABETES: ICD-10-CM

## 2025-07-28 DIAGNOSIS — E66.812 CLASS 2 OBESITY: Primary | ICD-10-CM

## 2025-07-28 PROCEDURE — 96372 THER/PROPH/DIAG INJ SC/IM: CPT | Performed by: NURSE PRACTITIONER

## 2025-07-28 PROCEDURE — 99214 OFFICE O/P EST MOD 30 MIN: CPT | Performed by: NURSE PRACTITIONER

## 2025-07-28 RX ADMIN — CYANOCOBALAMIN 1000 MCG: 1000 INJECTION, SOLUTION INTRAMUSCULAR; SUBCUTANEOUS at 13:25

## 2025-07-28 NOTE — PROGRESS NOTES
[facility]  [address]  [place] [address]     Office Note      Date: 2025  Patient Name: Marco A Lea Jr.  MRN: [id number]  : 1955  Subjective  Subjective     Chief Complaint  Obesity Management follow-up          Marco A Lea Jr. presents to [facility] for obesity management. His wife is present during his visit today.   Patient is satisfied with weight loss progress. Appetite is well controlled. Reports no side effects of prescribed medications today. He is now getting ozempic from his primary care doctor's office, has enough until December. His pharmacy would only dispense 30 capsules of diethylpropion instead of 90 but he was told he can request refills from them. The patient is taking multivitamin and is taking fish oil. The patient is using a food journal.      24 hour recall:  Calories: 807  Carbs: 100  Protein: 61  Water Intake: 32 oz  Other beverages: iced tea ( and ) Occasional Dr Pepper Zero  Alcohol: none    The patient is exercising by walking with a FITT score of:    Frequency Intensity Time Strength Training   [x]   0, none []   0 [x]   0 [x]   0   []   1 (1-2x/week) [x]   1 (light) []   1 (<10 min) []   1 (1x/week)   []   2 (3-5x/week) []   2 (moderate) []   2 (10-20 min) []   2 (2x/week)   [x]   3 (daily) []   3 (moderately hard)  []   4 (very hard) []   3 (20-30 min)  []   4 (>30 min) []   3 (3-4x/week)             Review of Systems   Constitutional:  Negative for appetite change and fatigue.   Eyes:  Negative for visual disturbance.   Cardiovascular:  Negative for chest pain and palpitations.   Gastrointestinal:  Negative for constipation and indigestion.   Neurological:  Negative for light-headedness.         Current Outpatient Medications:     buPROPion (WELLBUTRIN) 75 MG tablet, bupropion HCl 75 mg tablet, Disp: , Rfl:     CloNIDine (CATAPRES) 0.1 MG tablet, Take 1 tablet by mouth 2  (Two) Times a Day As Needed., Disp: , Rfl:     cyclobenzaprine (FLEXERIL) 10 MG tablet, Take 1 tablet by mouth 2 (Two) Times a Day As Needed., Disp: , Rfl:     Diethylpropion HCl ER 75 MG tablet sustained-release 24 hour, Take one tablet at 11 am., Disp: 60 each, Rfl: 0    fluticasone (FLONASE) 50 MCG/ACT nasal spray, , Disp: , Rfl:     ketorolac (TORADOL) 10 MG tablet, Take 1 tablet by mouth Every 6 (Six) Hours As Needed., Disp: , Rfl:     levocetirizine (XYZAL) 5 MG tablet, levocetirizine 5 mg tablet, Disp: , Rfl:     lidocaine (LIDODERM) 5 %, Place 1 patch on the skin as directed by provider Daily., Disp: , Rfl:     naproxen (EC NAPROSYN) 500 MG EC tablet, Take 1 tablet by mouth 2 (Two) Times a Day With Meals., Disp: , Rfl:     Omega-3 Fatty Acids (fish oil) 1000 MG capsule capsule, Take 1 capsule by mouth 2 (Two) Times a Day With Meals., Disp: , Rfl:     omeprazole (priLOSEC) 40 MG capsule, , Disp: , Rfl:     ondansetron (ZOFRAN) 4 MG tablet, Take 1 tablet by mouth Every 8 (Eight) Hours As Needed for nausea or vomiting., Disp: 20 tablet, Rfl: 0    oxyCODONE-acetaminophen (PERCOCET)  MG per tablet, Take 1 tablet by mouth Every 6 (Six) Hours As Needed., Disp: , Rfl:     propranolol (INDERAL) 40 MG tablet, Take 1 tablet by mouth Daily., Disp: , Rfl:     Semaglutide-Weight Management 0.25 MG/0.5ML solution auto-injector, Inject 0.5 mL under the skin into the appropriate area as directed 1 (One) Time Per Week. (Patient taking differently: Inject 0.5 mL under the skin into the appropriate area as directed 1 (One) Time Per Week. Ozempic), Disp: 2 mL, Rfl: 0    tiZANidine (ZANAFLEX) 4 MG tablet, Take 1 tablet by mouth Every 8 (Eight) Hours As Needed., Disp: , Rfl:     valsartan-hydrochlorothiazide (DIOVAN-HCT) 320-25 MG per tablet, , Disp: , Rfl:     vitamin B-12 (CYANOCOBALAMIN) 1000 MCG tablet, Take 1 tablet by mouth Daily., Disp: , Rfl:     vitamin D3 125 MCG (5000 UT) capsule capsule, Take 1 capsule by mouth  "Daily., Disp: , Rfl:     Zinc Sulfate (ZINC 15 PO), Take  by mouth., Disp: , Rfl:     Current Facility-Administered Medications:     cyanocobalamin injection 1,000 mcg, 1,000 mcg, Intramuscular, Q28 Days, Luci Uriostegui APRN, 1,000 mcg at 07/28/25 1325    Objective   Start weight: 312 pounds.    Total weight loss: -52 pounds/-16.66%  Change in weight since last visit: -10lb    Body mass index is 37.84 kg/m².   Body composition analysis completed and showed:   Body Fat %: 41.6    Measurements (in inches)  Waist Circumference: 54      Vital Signs:   /76 (BP Location: Right arm, Patient Position: Sitting)   Pulse 60   Ht 176.5 cm (69.5\")   Wt 118 kg (260 lb)   BMI 37.84 kg/m²     No LMP for male patient.    Physical Exam   General appears stated age and normal appearance   HEENT PERRLA, EOM intact, and conjunctivae normal   Chest/lungs Normal rate, Regular rhythm, and Breathing is unlabored   Extremities without edema   Neuro Good historian and No focal deficit   Skin Warm, dry, intact   Psych normal behavior, normal thought content, and normal concentration     Result Review :                Assessment / Plan        Diagnoses and all orders for this visit:    1. Class 2 obesity (Primary)  Assessment & Plan:  Patient's (Body mass index is 37.84 kg/m².) indicates that they are morbidly/severely obese (BMI > 40 or > 35 with obesity - related health condition) with health conditions that include hypertension, impaired fasting glucose, dyslipidemias, and osteoarthritis . Weight is improving with treatment. BMI  is above average; BMI management plan is completed. We discussed low calorie, low carb based diet program, portion control, increasing exercise, pharmacologic options including ozempic, diethylpropion, and an fritz-based approach such as [employer] Ch[employer] Pal or Lose It.     I have instructed the patient to continue with pursuit of medical weight loss as a part of this program. Patient does meet criteria for use " "of anorectics at this time as BMI >30  and is not at treatment goal.     The current plan for this month includes:   - Adjust exercise as discussed- encouraged to increase steps to at least 5K daily. Currently around 2K. Plans to start walking with his wife.  - Continue to prioritize protein, fiber, and hydration.   - OK to continue ozempic 0.25mg, this was prescribed/ordered by his PCP. It is the same thing as wegovy.  - Con't diethylpropion, tolerating without side effects, keeps appetite well controlled.   - Almost to treatment goal of 250lb, extend visits to every 3 months.    Continue sympathomimetic (medication refilled).  This patient 1) has no evidence of serious cardiovascular disease; 2) does not have serious psychiatric disease or a history of substance abuse; 3) has been informed about weight loss medications that are FDA approved for long-term use and told that these have been all documented to be safe and effective whereas phentermine has not; 4) does not demonstrate a clinically significant increase in pulse or blood pressure when taking phentermine; and 5) demonstrates significant weight loss while using the medication.  Patient understands that all antiobesity medications are contraindicated in pregnancy.  Patient denies a history of glaucoma.  Patient understands that long-term use of phentermine is considered off label use of this medication, however, that the endocrine Society and recent research supports that long-term use of phentermine does not appear to have detrimental health effects when used on the appropriate patient.  In addition, a 2019 study published in an obesity journal on [id number] patient's concluded that \"recommendations to limit phentermine to less than 3 months do not align with current concept of pharmacologic treatment of obesity\", and that \"long-term phentermine users experience greater weight loss without apparent increases in cardiovascular risk\".    We reviewed potential " side effects including insomnia, dry mouth, increased heart rate and blood pressure, increased anxiety.  We reviewed reducing caffeine consumption while taking phentermine.  especially if the patient experiences side effects.  The potential risk and benefits of phentermine have been reviewed with the patient, and alternative treatment options were discussed.  All questions were answered, and the patient wishes to move forward with this medication.        2. Prediabetes  Overview:  03/2025 A1c 5.9    Assessment & Plan:  Denies hypoglycemia. Continue low carb diet, regular physical activity, and weight reduction of 10-15%. Cont ozempic.               ICD-10-CM ICD-9-CM   1. Class 2 obesity  E66.812 278.00   2. Prediabetes  R73.03 790.29          Follow Up   Return in about 3 months (around 10/28/2025) for Next scheduled follow up.  Patient was given instructions and counseling regarding his condition or for health maintenance advice. Please see specific information pulled into the AVS if appropriate.     Luci Uriostegui, WINIFRED  07/28/2025

## 2025-07-28 NOTE — ASSESSMENT & PLAN NOTE
Patient's (Body mass index is 37.84 kg/m².) indicates that they are morbidly/severely obese (BMI > 40 or > 35 with obesity - related health condition) with health conditions that include hypertension, impaired fasting glucose, dyslipidemias, and osteoarthritis . Weight is improving with treatment. BMI  is above average; BMI management plan is completed. We discussed low calorie, low carb based diet program, portion control, increasing exercise, pharmacologic options including ozempic, diethylpropion, and an fritz-based approach such as Storrz Pal or Lose It.     I have instructed the patient to continue with pursuit of medical weight loss as a part of this program. Patient does meet criteria for use of anorectics at this time as BMI >30  and is not at treatment goal.     The current plan for this month includes:   - Adjust exercise as discussed- encouraged to increase steps to at least 5K daily. Currently around 2K. Plans to start walking with his wife.  - Continue to prioritize protein, fiber, and hydration.   - OK to continue ozempic 0.25mg, this was prescribed/ordered by his PCP. It is the same thing as wegovy.  - Con't diethylpropion, tolerating without side effects, keeps appetite well controlled.   - Almost to treatment goal of 250lb, extend visits to every 3 months.    Continue sympathomimetic (medication refilled).  This patient 1) has no evidence of serious cardiovascular disease; 2) does not have serious psychiatric disease or a history of substance abuse; 3) has been informed about weight loss medications that are FDA approved for long-term use and told that these have been all documented to be safe and effective whereas phentermine has not; 4) does not demonstrate a clinically significant increase in pulse or blood pressure when taking phentermine; and 5) demonstrates significant weight loss while using the medication.  Patient understands that all antiobesity medications are contraindicated in  "pregnancy.  Patient denies a history of glaucoma.  Patient understands that long-term use of phentermine is considered off label use of this medication, however, that the endocrine Society and recent research supports that long-term use of phentermine does not appear to have detrimental health effects when used on the appropriate patient.  In addition, a 2019 study published in an obesity journal on [id number] patient's concluded that \"recommendations to limit phentermine to less than 3 months do not align with current concept of pharmacologic treatment of obesity\", and that \"long-term phentermine users experience greater weight loss without apparent increases in cardiovascular risk\".    We reviewed potential side effects including insomnia, dry mouth, increased heart rate and blood pressure, increased anxiety.  We reviewed reducing caffeine consumption while taking phentermine.  especially if the patient experiences side effects.  The potential risk and benefits of phentermine have been reviewed with the patient, and alternative treatment options were discussed.  All questions were answered, and the patient wishes to move forward with this medication.    "